# Patient Record
Sex: FEMALE | Race: WHITE | HISPANIC OR LATINO | URBAN - METROPOLITAN AREA
[De-identification: names, ages, dates, MRNs, and addresses within clinical notes are randomized per-mention and may not be internally consistent; named-entity substitution may affect disease eponyms.]

---

## 2023-02-06 ENCOUNTER — HOSPITAL ENCOUNTER (EMERGENCY)
Facility: HOSPITAL | Age: 23
Discharge: HOME | End: 2023-02-06
Attending: EMERGENCY MEDICINE | Admitting: EMERGENCY MEDICINE
Payer: COMMERCIAL

## 2023-02-06 VITALS
HEIGHT: 65 IN | RESPIRATION RATE: 16 BRPM | TEMPERATURE: 98.1 F | DIASTOLIC BLOOD PRESSURE: 98 MMHG | OXYGEN SATURATION: 100 % | HEART RATE: 60 BPM | BODY MASS INDEX: 30.82 KG/M2 | SYSTOLIC BLOOD PRESSURE: 131 MMHG | WEIGHT: 185 LBS

## 2023-02-06 DIAGNOSIS — K05.30 PERICORONITIS: Primary | ICD-10-CM

## 2023-02-06 DIAGNOSIS — K08.89 PAIN, DENTAL: ICD-10-CM

## 2023-02-06 PROCEDURE — 99283 EMERGENCY DEPT VISIT LOW MDM: CPT

## 2023-02-06 RX ORDER — IBUPROFEN 800 MG/1
800 TABLET ORAL ONCE
Status: COMPLETED | OUTPATIENT
Start: 2023-02-06 | End: 2023-02-06

## 2023-02-06 RX ORDER — AMOXICILLIN 875 MG/1
875 TABLET, COATED ORAL 2 TIMES DAILY
Qty: 14 TABLET | Refills: 0 | Status: SHIPPED | OUTPATIENT
Start: 2023-02-06 | End: 2023-02-13

## 2023-02-06 RX ORDER — HYDROCODONE BITARTRATE AND ACETAMINOPHEN 5; 325 MG/1; MG/1
1-2 TABLET ORAL EVERY 8 HOURS PRN
Qty: 5 TABLET | Refills: 0 | Status: SHIPPED | OUTPATIENT
Start: 2023-02-06 | End: 2023-02-08

## 2023-02-06 RX ORDER — IBUPROFEN 600 MG/1
600 TABLET ORAL EVERY 8 HOURS PRN
Qty: 21 TABLET | Refills: 0 | Status: SHIPPED | OUTPATIENT
Start: 2023-02-06

## 2023-02-06 RX ORDER — AMOXICILLIN 875 MG/1
875 TABLET, COATED ORAL EVERY 12 HOURS SCHEDULED
Status: COMPLETED | OUTPATIENT
Start: 2023-02-06 | End: 2023-02-06

## 2023-02-06 RX ADMIN — AMOXICILLIN 875 MG: 875 TABLET, FILM COATED ORAL at 06:45

## 2023-02-06 RX ADMIN — IBUPROFEN 800 MG: 800 TABLET ORAL at 06:45

## 2023-02-06 NOTE — ED PROVIDER NOTES
EMERGENCY DEPARTMENT ENCOUNTER    Pt Name: Catalina Antunez  MRN: [id number]  Pt :   2000  Room Number:    Date of encounter:  2023  PCP: Sissy Patel APRN  ED Provider: Gene Moe MD    Dental pain, 1 to 2 days, severe in nature and keeping her from sleeping, no known injury, fevers chills no nausea or vomiting.        PAST MEDICAL HISTORY  No past medical history on file.      PAST SURGICAL HISTORY  No past surgical history on file.      FAMILY HISTORY  No family history on file.      SOCIAL HISTORY  Social History     Socioeconomic History   • Marital status:          ALLERGIES  Patient has no allergy information on record.        REVIEW OF SYSTEMS  Review of Systems       All systems reviewed and negative except for those discussed in HPI.       PHYSICAL EXAM    I have reviewed the triage vital signs and nursing notes.    ED Triage Vitals [23 0559]   Temp Heart Rate Resp BP SpO2   98.1 °F (36.7 °C) 60 16 131/98 100 %      Temp src Heart Rate Source Patient Position BP Location FiO2 (%)   Oral Monitor Sitting Left arm --       Physical Exam  GENERAL:   Appears in no acute distress.   HENT: Nares patent.  Tenderness over the front incisor, #9.  No gingivitis or abscess on examination.  RESPIRATORY: Normal effort.  No audible wheezes, rales   MUSCULOSKELETAL: No deformities.   NEURO: Alert, moves all extremities,   SKIN: Warm, dry, no rash visualized.    MEDICATIONS GIVEN IN ER    Medications   ibuprofen (ADVIL,MOTRIN) tablet 800 mg (has no administration in time range)   amoxicillin (AMOXIL) tablet 875 mg (has no administration in time range)         MEDICAL DECISION MAKING, PROGRESS, and CONSULTS    All labs have been independently reviewed by me.  All radiology studies have been reviewed by me and the radiologist dictating the report.  All EKG's have been independently viewed and interpreted by me.      Discussion below represents my analysis of pertinent findings  related to patient's condition, differential diagnosis, treatment plan and final disposition.      Differential diagnosis:    Dental pain, abscess, sinusitis          Orders placed during this visit:  No orders of the defined types were placed in this encounter.        Additional orders considered but not ordered:  Indication for laboratory studies, or facial imaging as there is no exam of abscess, or clinical findings of sepsis after evaluation.    ED Course:                      AS OF 06:39 EST VITALS:    BP - 131/98  HR - 60  TEMP - 98.1 °F (36.7 °C) (Oral)  O2 SATS - 100%                  DIAGNOSIS  Final diagnoses:   Pericoronitis   Pain, dental         DISPOSITION  DISCHARGE    Patient discharged in stable condition.    Reviewed implications of results, diagnosis, meds, responsibility to follow up, warning signs and symptoms of possible worsening, potential complications and reasons to return to ER.    Patient/Family voiced understanding of above instructions.    Discussed plan for discharge, as there is no emergent indication for admission.  Pt/family is agreeable and understands need for follow up and possible repeat testing.  Pt/family is aware that discharge does not mean that nothing is wrong but that it indicates no emergency is currently present that requires admission and they must continue care with follow-up as given below or with a physician of their choice.     FOLLOW-UP  PATIENT CONNECTION - [facility] [address]  [phone]        Sissy Patel, APRN  [address] [facility]  [address]  [facility] [address]  [phone]               Medication List      New Prescriptions    amoxicillin 875 MG tablet  Commonly known as: AMOXIL  Take 1 tablet by mouth 2 (Two) Times a Day for 7 days.     HYDROcodone-acetaminophen 5-325 MG per tablet  Commonly known as: NORCO  Take 1-2 tablets by mouth Every 8 (Eight) Hours As Needed for Severe Pain for up to 2 days.     ibuprofen 600 MG  tablet  Commonly known as: ADVIL,MOTRIN  Take 1 tablet by mouth Every 8 (Eight) Hours As Needed for Mild Pain.           Where to Get Your Medications      These medications were sent to [facility] [address] [place] [address] [address] [place] [address] - [phone]  - [phone] [place] [address]    Phone: [phone]   · amoxicillin 875 MG tablet  · HYDROcodone-acetaminophen 5-325 MG per tablet  · ibuprofen 600 MG tablet           Please note that portions of this document were completed with voice recognition software.      Gene Moe MD  02/06/23 [id number]

## 2023-04-19 ENCOUNTER — TELEPHONE (OUTPATIENT)
Dept: FAMILY MEDICINE CLINIC | Facility: CLINIC | Age: 23
End: 2023-04-19

## 2023-04-19 ENCOUNTER — OFFICE VISIT (OUTPATIENT)
Dept: FAMILY MEDICINE CLINIC | Facility: CLINIC | Age: 23
End: 2023-04-19
Payer: COMMERCIAL

## 2023-04-19 ENCOUNTER — LAB (OUTPATIENT)
Dept: LAB | Facility: HOSPITAL | Age: 23
End: 2023-04-19
Payer: COMMERCIAL

## 2023-04-19 VITALS
WEIGHT: 184.4 LBS | HEIGHT: 65 IN | DIASTOLIC BLOOD PRESSURE: 80 MMHG | HEART RATE: 73 BPM | OXYGEN SATURATION: 98 % | SYSTOLIC BLOOD PRESSURE: 118 MMHG | BODY MASS INDEX: 30.72 KG/M2

## 2023-04-19 DIAGNOSIS — L65.9 HAIR LOSS: ICD-10-CM

## 2023-04-19 DIAGNOSIS — N89.8 VAGINAL DISCHARGE: Primary | ICD-10-CM

## 2023-04-19 DIAGNOSIS — E11.65 TYPE 2 DIABETES MELLITUS WITH HYPERGLYCEMIA, WITHOUT LONG-TERM CURRENT USE OF INSULIN: Primary | ICD-10-CM

## 2023-04-19 DIAGNOSIS — R53.83 FATIGUE, UNSPECIFIED TYPE: ICD-10-CM

## 2023-04-19 DIAGNOSIS — E11.65 TYPE 2 DIABETES MELLITUS WITH HYPERGLYCEMIA, WITHOUT LONG-TERM CURRENT USE OF INSULIN: ICD-10-CM

## 2023-04-19 LAB
25(OH)D3 SERPL-MCNC: 19.7 NG/ML (ref 30–100)
ALBUMIN SERPL-MCNC: 4.4 G/DL (ref 3.5–5.2)
ALBUMIN/GLOB SERPL: 1.1 G/DL
ALP SERPL-CCNC: 141 U/L (ref 39–117)
ALT SERPL W P-5'-P-CCNC: 27 U/L (ref 1–33)
ANION GAP SERPL CALCULATED.3IONS-SCNC: 12 MMOL/L (ref 5–15)
AST SERPL-CCNC: 20 U/L (ref 1–32)
BILIRUB SERPL-MCNC: 0.3 MG/DL (ref 0–1.2)
BUN SERPL-MCNC: 10 MG/DL (ref 6–20)
BUN/CREAT SERPL: 13.9 (ref 7–25)
CALCIUM SPEC-SCNC: 9.2 MG/DL (ref 8.6–10.5)
CHLORIDE SERPL-SCNC: 99 MMOL/L (ref 98–107)
CHOLEST SERPL-MCNC: 204 MG/DL (ref 0–200)
CO2 SERPL-SCNC: 23 MMOL/L (ref 22–29)
CREAT SERPL-MCNC: 0.72 MG/DL (ref 0.57–1)
DEPRECATED RDW RBC AUTO: 37.7 FL (ref 37–54)
EGFRCR SERPLBLD CKD-EPI 2021: 120.7 ML/MIN/1.73
ERYTHROCYTE [DISTWIDTH] IN BLOOD BY AUTOMATED COUNT: 11.8 % (ref 12.3–15.4)
EXPIRATION DATE: NORMAL
GLOBULIN UR ELPH-MCNC: 4 GM/DL
GLUCOSE SERPL-MCNC: 348 MG/DL (ref 65–99)
HBA1C MFR BLD: 12.7 %
HCT VFR BLD AUTO: 45.7 % (ref 34–46.6)
HDLC SERPL-MCNC: 47 MG/DL (ref 40–60)
HGB BLD-MCNC: 15 G/DL (ref 12–15.9)
LDLC SERPL CALC-MCNC: 133 MG/DL (ref 0–100)
LDLC/HDLC SERPL: 2.78 {RATIO}
Lab: NORMAL
MCH RBC QN AUTO: 29.2 PG (ref 26.6–33)
MCHC RBC AUTO-ENTMCNC: 32.8 G/DL (ref 31.5–35.7)
MCV RBC AUTO: 88.9 FL (ref 79–97)
PLATELET # BLD AUTO: 278 10*3/MM3 (ref 140–450)
PMV BLD AUTO: 10.2 FL (ref 6–12)
POTASSIUM SERPL-SCNC: 4.6 MMOL/L (ref 3.5–5.2)
PROT SERPL-MCNC: 8.4 G/DL (ref 6–8.5)
RBC # BLD AUTO: 5.14 10*6/MM3 (ref 3.77–5.28)
SODIUM SERPL-SCNC: 134 MMOL/L (ref 136–145)
TRIGL SERPL-MCNC: 132 MG/DL (ref 0–150)
TSH SERPL DL<=0.05 MIU/L-ACNC: 2.04 UIU/ML (ref 0.27–4.2)
VLDLC SERPL-MCNC: 24 MG/DL (ref 5–40)
WBC NRBC COR # BLD: 5.57 10*3/MM3 (ref 3.4–10.8)

## 2023-04-19 PROCEDURE — 87798 DETECT AGENT NOS DNA AMP: CPT | Performed by: NURSE PRACTITIONER

## 2023-04-19 PROCEDURE — 87661 TRICHOMONAS VAGINALIS AMPLIF: CPT | Performed by: NURSE PRACTITIONER

## 2023-04-19 PROCEDURE — 87491 CHLMYD TRACH DNA AMP PROBE: CPT | Performed by: NURSE PRACTITIONER

## 2023-04-19 PROCEDURE — 87801 DETECT AGNT MULT DNA AMPLI: CPT | Performed by: NURSE PRACTITIONER

## 2023-04-19 PROCEDURE — 87591 N.GONORRHOEAE DNA AMP PROB: CPT | Performed by: NURSE PRACTITIONER

## 2023-04-19 PROCEDURE — 80061 LIPID PANEL: CPT

## 2023-04-19 PROCEDURE — 82306 VITAMIN D 25 HYDROXY: CPT

## 2023-04-19 PROCEDURE — 80050 GENERAL HEALTH PANEL: CPT

## 2023-04-19 RX ORDER — GLUCOSAMINE HCL/CHONDROITIN SU 500-400 MG
1 CAPSULE ORAL 4 TIMES DAILY
Qty: 150 EACH | Refills: 11 | Status: SHIPPED | OUTPATIENT
Start: 2023-04-19

## 2023-04-19 RX ORDER — BLOOD-GLUCOSE METER
1 KIT MISCELLANEOUS ONCE
Qty: 1 EACH | Refills: 0 | Status: SHIPPED | OUTPATIENT
Start: 2023-04-19 | End: 2023-04-19

## 2023-04-19 RX ORDER — LANCETS
EACH MISCELLANEOUS
Qty: 120 EACH | Refills: 11 | Status: SHIPPED | OUTPATIENT
Start: 2023-04-19

## 2023-04-19 NOTE — PROGRESS NOTES
"Chief Complaint  Diabetes (Pt states she would like to get checked.)    Subjective        Catalina Antunez presents to [facility]  History of Present Illness  Pt is here today for Diabetes follow up. She takes Jardiance for management. She was diagnosed last year. A1C today is 12.7%. She took Metformin in the past, but was not able to tolerate due to GI effects. She was started on Jardiance after that. She has been taking her Jardiance about twice a week. She saw a Diabetes Educator in the past. She reports that she craves sweets and carbs around her menstrual cycle. Otherwise, she tries to portion her foods appropriately for a pt with Diabetes. She has been eating more vegetables recently. Will follow up in 3 months. Pt agrees to take medication daily. She will also check blood glucose levels frequently at home. She does not have a monitor; will send prescription to pharmacy. Labs will be drawn today and once reviewed, will decide if medication changes need to be made. She has a week supply of Jardiance at home.     She is also concerned about yeast infection. She has vaginal itching and has yellow mucous like discharge. Last sexual activity was approximately one month ago.     Objective   Vital Signs:  /80   Pulse 73   Ht 165.1 cm (65\")   Wt 83.6 kg (184 lb 6.4 oz)   SpO2 98%   BMI 30.69 kg/m²   Estimated body mass index is 30.69 kg/m² as calculated from the following:    Height as of this encounter: 165.1 cm (65\").    Weight as of this encounter: 83.6 kg (184 lb 6.4 oz).           Physical Exam  Vitals reviewed.   Constitutional:       Appearance: Normal appearance.   HENT:      Nose: Nose normal.      Mouth/Throat:      Pharynx: Oropharynx is clear.   Eyes:      Conjunctiva/sclera: Conjunctivae normal.   Cardiovascular:      Rate and Rhythm: Normal rate and regular rhythm.      Heart sounds: Normal heart sounds.   Pulmonary:      Effort: Pulmonary effort is normal.      " Breath sounds: Normal breath sounds.   Musculoskeletal:         General: Normal range of motion.      Cervical back: Normal range of motion.   Skin:     General: Skin is warm.   Neurological:      Mental Status: She is alert and oriented to person, place, and time.   Psychiatric:         Mood and Affect: Mood normal.         Behavior: Behavior normal.         Thought Content: Thought content normal.        Result Review :                 Assessment and Plan   Diagnoses and all orders for this visit:    1. Vaginal discharge (Primary)  -     Chlamydia trachomatis, Neisseria gonorrhoeae, Trichomonas vaginalis, PCR - Urine, Urine, Clean Catch; Future  -     NuSwab VG+ - Swab, Vagina; Future  -     Chlamydia trachomatis, Neisseria gonorrhoeae, Trichomonas vaginalis, PCR - Urine, Urine, Clean Catch  -     NuSwab VG+ - Swab, Vagina    2. Type 2 diabetes mellitus with hyperglycemia, without long-term current use of insulin  -     POC Glycosylated Hemoglobin (Hb A1C)  -     glucose monitor monitoring kit; 1 each 1 (One) Time for 1 dose.  Dispense: 1 each; Refill: 0  -     Comprehensive Metabolic Panel; Future  -     Lipid Panel; Future    3. Fatigue, unspecified type  -     CBC (No Diff); Future  -     Vitamin D,25-Hydroxy; Future  -     TSH Rfx On Abnormal To Free T4; Future    4. Hair loss  -     TSH Rfx On Abnormal To Free T4; Future             Follow Up   Return in about 3 months (around 7/19/2023) for Annual.  Patient was given instructions and counseling regarding her condition or for health maintenance advice. Please see specific information pulled into the AVS if appropriate.

## 2023-04-19 NOTE — TELEPHONE ENCOUNTER
Pharmacy Name: [facility] [id number] [place] [id number] AT [facility] - [phone] [facility] [phone]      Pharmacy representative name: ADRIAN    Pharmacy representative phone number: [phone]    What question does the pharmacy have: PHARMACY STATES THAT THEY RECEIVED THE PRESCRIPTION FOR THE GLUCOSE MONITOR, BUT THIS DID NOT INCLUDE THE TEST STRIPS OR LANCETS FOR THE DEVICE.    Who is the provider that prescribed the medication: IRENA MACHUCA    Additional notes: PHARMACY ALSO STATES THAT THEY DO NOT HAVE INSURANCE ON FILE FOR THE PATIENT AND WANTED TO ASK ABOUT OBTAINING THAT INFORMATION, IF APPLICABLE, BECAUSE SELF PAY COSTS FOR THIS DEVICE ARE TYPICALLY VERY EXPENSIVE.

## 2023-04-21 ENCOUNTER — TELEPHONE (OUTPATIENT)
Dept: FAMILY MEDICINE CLINIC | Facility: CLINIC | Age: 23
End: 2023-04-21
Payer: COMMERCIAL

## 2023-04-21 DIAGNOSIS — E11.65 TYPE 2 DIABETES MELLITUS WITH HYPERGLYCEMIA, WITHOUT LONG-TERM CURRENT USE OF INSULIN: Primary | ICD-10-CM

## 2023-04-21 LAB
C TRACH RRNA SPEC QL NAA+PROBE: NEGATIVE
N GONORRHOEA RRNA SPEC QL NAA+PROBE: NEGATIVE
T VAGINALIS RRNA SPEC QL NAA+PROBE: NEGATIVE

## 2023-04-21 RX ORDER — BLOOD-GLUCOSE METER
1 KIT MISCELLANEOUS DAILY
Qty: 1 EACH | Refills: 0 | Status: SHIPPED | OUTPATIENT
Start: 2023-04-21

## 2023-04-21 NOTE — TELEPHONE ENCOUNTER
Rx Refill Note  Requested Prescriptions     Signed Prescriptions Disp Refills   • glucose monitor monitoring kit 1 each 0     Si each Daily. Use daily as directed.     Authorizing Provider: IRENA MACHUCA     Ordering User: NELL DAMON      Last office visit with prescribing clinician: 2023   Last telemedicine visit with prescribing clinician: 2023   Next office visit with prescribing clinician: 2023                         Would you like a call back once the refill request has been completed: [] Yes [] No    If the office needs to give you a call back, can they leave a voicemail: [] Yes [] No    Nell Damon MA  23, 15:47 EDT

## 2023-04-21 NOTE — TELEPHONE ENCOUNTER
Caller: [facility] [address] [place] [address] - [phone] [facility] [phone]     Relationship: Pharmacy    Best call back number:[phone]    What medications are you currently taking:   Current Outpatient Medications on File Prior to Visit   Medication Sig Dispense Refill   • empagliflozin (JARDIANCE) 25 MG tablet tablet Take 1 tablet by mouth Daily.     • Glucose Blood (Blood Glucose Test) strip 1 each by In Vitro route 4 (Four) Times a Day. 150 each 11   • Lancets (onetouch ultrasoft) lancets Check blood glucose 30 minutes before each meal and before bed. 120 each 11   • norgestrel-ethinyl estradiol (LOW-OGESTREL,CRYSELLE) 0.3-30 MG-MCG per tablet Take 1 tablet by mouth Daily. 28 tablet 12     No current facility-administered medications on file prior to visit.          Which medication are you concerned about: TEST STRIPS AND LANCETS        What are your concerns: PHARMACY STATED THE LANCETS SAID ONE TOUCH AND THE TEST STRIPS DIDN'T SPECIFY SO THEY CONTACTED THE PATIENT TO CONFIRM WHAT HER METER WAS AND SHE LOST HER METER IN A MOVE. PHARMACY REQUESTING TO SEND NEW PRESCRIPTION FOR THE ONE TOUCH METER AS WELL AS THEY HAVE THE ONE TOUCH LANCETS AND TEST STRIPS AND HER INSURANCE PAYS FOR THE ONE TOUCH

## 2023-04-23 LAB
A VAGINAE DNA VAG QL NAA+PROBE: ABNORMAL SCORE
BVAB2 DNA VAG QL NAA+PROBE: ABNORMAL SCORE
C ALBICANS DNA VAG QL NAA+PROBE: POSITIVE
C GLABRATA DNA VAG QL NAA+PROBE: POSITIVE
C TRACH DNA VAG QL NAA+PROBE: NEGATIVE
MEGA1 DNA VAG QL NAA+PROBE: ABNORMAL SCORE
N GONORRHOEA DNA VAG QL NAA+PROBE: NEGATIVE
T VAGINALIS DNA VAG QL NAA+PROBE: NEGATIVE

## 2023-04-24 RX ORDER — FLUCONAZOLE 150 MG/1
150 TABLET ORAL ONCE
Qty: 1 TABLET | Refills: 0 | Status: SHIPPED | OUTPATIENT
Start: 2023-04-24 | End: 2023-04-24

## 2023-04-24 RX ORDER — METRONIDAZOLE 500 MG/1
500 TABLET ORAL 2 TIMES DAILY
Qty: 14 TABLET | Refills: 0 | Status: SHIPPED | OUTPATIENT
Start: 2023-04-24 | End: 2023-05-01

## 2023-04-26 NOTE — TELEPHONE ENCOUNTER
Rx Refill Note  Requested Prescriptions     Pending Prescriptions Disp Refills   • norgestrel-ethinyl estradiol (LOW-OGESTREL,CRYSELLE) 0.3-30 MG-MCG per tablet 28 tablet 12     Sig: Take 1 tablet by mouth Daily.   • empagliflozin (JARDIANCE) 25 MG tablet tablet 30 tablet      Sig: Take 1 tablet by mouth Daily.      Last office visit with prescribing clinician: 4/19/2023   Last telemedicine visit with prescribing clinician: 7/20/2023   Next office visit with prescribing clinician: 7/20/2023                         Would you like a call back once the refill request has been completed: [] Yes [] No    If the office needs to give you a call back, can they leave a voicemail: [] Yes [] No    Jyothi Blair MA  04/26/23, 09:33 EDT

## 2023-05-03 ENCOUNTER — PATIENT MESSAGE (OUTPATIENT)
Dept: FAMILY MEDICINE CLINIC | Facility: CLINIC | Age: 23
End: 2023-05-03
Payer: COMMERCIAL

## 2023-06-13 ENCOUNTER — PRIOR AUTHORIZATION (OUTPATIENT)
Dept: FAMILY MEDICINE CLINIC | Facility: CLINIC | Age: 23
End: 2023-06-13
Payer: COMMERCIAL

## 2023-06-13 NOTE — TELEPHONE ENCOUNTER
(Key: [id number])  Med:Jardiance 25MG tablets  Status:sent to plan, awaiting determination   Created:06/13/2023

## 2023-09-18 ENCOUNTER — OFFICE VISIT (OUTPATIENT)
Dept: INTERNAL MEDICINE | Facility: CLINIC | Age: 23
End: 2023-09-18
Payer: COMMERCIAL

## 2023-09-18 VITALS
HEART RATE: 86 BPM | DIASTOLIC BLOOD PRESSURE: 64 MMHG | HEIGHT: 66 IN | TEMPERATURE: 98.2 F | OXYGEN SATURATION: 97 % | WEIGHT: 196.6 LBS | SYSTOLIC BLOOD PRESSURE: 114 MMHG | BODY MASS INDEX: 31.6 KG/M2

## 2023-09-18 DIAGNOSIS — F41.9 ANXIETY AND DEPRESSION: ICD-10-CM

## 2023-09-18 DIAGNOSIS — F32.A ANXIETY AND DEPRESSION: ICD-10-CM

## 2023-09-18 DIAGNOSIS — E11.65 TYPE 2 DIABETES MELLITUS WITH HYPERGLYCEMIA, UNSPECIFIED WHETHER LONG TERM INSULIN USE: ICD-10-CM

## 2023-09-18 DIAGNOSIS — Z34.90 PREGNANCY, UNSPECIFIED GESTATIONAL AGE: Primary | ICD-10-CM

## 2023-09-18 DIAGNOSIS — N92.6 MISSED PERIOD: ICD-10-CM

## 2023-09-18 LAB
EXPIRATION DATE: NORMAL
HBA1C MFR BLD: 10.1 %
Lab: NORMAL

## 2023-09-18 NOTE — PROGRESS NOTES
Office Note     Name: Catalina Antunez    : 2000     MRN: [id number]     Chief Complaint  Diabetes, Cyst (On vaginal area and has pus ), Depression (Phq 17 ), and Anxiety (Phq 17)    Subjective     History of Present Illness:  Catalina Antunez is a 23 y.o. female who presents today for         Review of Systems:   Review of Systems    Past Medical History:   Past Medical History:   Diagnosis Date   • Diabetes mellitus 2023       Past Surgical History:   Past Surgical History:   Procedure Laterality Date   • CHOLECYSTECTOMY  2019       Family History:   Family History   Problem Relation Age of Onset   • Diabetes Mother        Social History:   Social History     Socioeconomic History   • Marital status:    Tobacco Use   • Smoking status: Never   • Smokeless tobacco: Never   Vaping Use   • Vaping Use: Never used   Substance and Sexual Activity   • Alcohol use: Yes     Alcohol/week: 2.0 standard drinks     Types: 2 Cans of beer per week   • Drug use: Never   • Sexual activity: Yes     Partners: Male     Birth control/protection: Pill       Immunizations:   Immunization History   Administered Date(s) Administered   • DTaP 1999, 2000, 2001, 2003   • DTaP, Unspecified 2010   • FluMist 2-49yrs 2015   • Fluzone (or Fluarix & Flulaval for VFC) >6mos 10/15/2018   • HPV Quadrivalent 2010, 2011, 06/15/2016, 2017   • Hep A, 2 Dose 2007, 2008   • Hep A, Unspecified 2007, 2008   • Hepatitis B Adult/Adolescent IM 1999, 2000, 2001   • Hib (PRP-OMP) 1999, 2000   • Hpv9 06/15/2016, 2016, 2017   • IPV 1999, 2000, 2001, 2003   • Influenza LAIV (Nasal) 2011, 2012   • Influenza, Unspecified 10/15/2018, 2019   • MMR 2000, 2001   • Meningococcal MCV4P (Menactra) 2010, 06/15/2016   • PEDS-Pneumococcal Conjugate (PCV7) 2010  "  • PPD Test 09/15/2016   • Tdap 08/04/2010, 01/02/2020   • Varicella 07/10/2002, 08/29/2011        Medications:     Current Outpatient Medications:   •  Glucose Blood (Blood Glucose Test) strip, 1 each by In Vitro route 4 (Four) Times a Day., Disp: 150 each, Rfl: 11  •  Lancets (onetouch ultrasoft) lancets, Check blood glucose 30 minutes before each meal and before bed., Disp: 120 each, Rfl: 11  •  empagliflozin (JARDIANCE) 25 MG tablet tablet, Take 1 tablet by mouth Daily., Disp: 30 tablet, Rfl: 2  •  glucose monitor monitoring kit, 1 each Daily. Use daily as directed. (Patient not taking: Reported on 9/18/2023), Disp: 1 each, Rfl: 0  •  norgestrel-ethinyl estradiol (LOW-OGESTREL,CRYSELLE) 0.3-30 MG-MCG per tablet, Take 1 tablet by mouth Daily for 168 days., Disp: 28 tablet, Rfl: 5    Allergies:   Allergies   Allergen Reactions   • Bee Venom Swelling   • Mushroom Swelling       Objective     Vital Signs  /64   Pulse 86   Temp 98.2 °F (36.8 °C) (Temporal)   Ht 167 cm (65.75\")   Wt 89.2 kg (196 lb 9.6 oz)   SpO2 97%   BMI 31.97 kg/m²   Estimated body mass index is 31.97 kg/m² as calculated from the following:    Height as of this encounter: 167 cm (65.75\").    Weight as of this encounter: 89.2 kg (196 lb 9.6 oz).          Physical Exam     Result Review :                  Assessment and Plan     There are no diagnoses linked to this encounter.     Follow Up  No follow-ups on file.    MD TOM HurleyE PC [place]  [facility]  [address] [facility]  [address] [address] [phone]    "

## 2023-09-18 NOTE — PROGRESS NOTES
Office Note     Name: Catalina Antunez    : 2000     MRN: [id number]     Chief Complaint  Diabetes, Cyst (On vaginal area and has pus ), Depression (Phq 17 ), and Anxiety (Phq 17)    Subjective     History of Present Illness:  Catalina Antunez is a 23 y.o. female who presents today for       She is .    Found out she was pregnant 2 weeks ago, she has an appointment with ob/gyn tomorrow.  This will be her   Cysts on the vaginal wall cyst    PHQ-9 Depression Screening  Little interest or pleasure in doing things? 2-->more than half the days   Feeling down, depressed, or hopeless? 0-->not at all   Trouble falling or staying asleep, or sleeping too much? 3-->nearly every day   Feeling tired or having little energy? 3-->nearly every day   Poor appetite or overeating? 3-->nearly every day   Feeling bad about yourself - or that you are a failure or have let yourself or your family down? 0-->not at all   Trouble concentrating on things, such as reading the newspaper or watching television? 3-->nearly every day   Moving or speaking so slowly that other people could have noticed? Or the opposite - being so fidgety or restless that you have been moving around a lot more than usual? 3-->nearly every day   Thoughts that you would be better off dead, or of hurting yourself in some way? 0-->not at all   PHQ-9 Total Score 17   If you checked off any problems, how difficult have these problems made it for you to do your work, take care of things at home, or get along with other people? somewhat difficult       Anxiety JENNIFER-7    Feeling nervous, anxious or on edge: Nearly every day  Not being able to stop or control worrying: Nearly every day  Worrying too much about different things: Nearly every day  Trouble Relaxing: Nearly every day  Being so restless that it is hard to sit still: Nearly every day  Becoming easily annoyed or irritable: Several days  Feeling afraid as if something awful might happen: Several  days  JENNIFER 7 Total Score: 17  If you checked any problems, how difficult have these problems made it for you to do your work, take care of things at home, or get along with other people: Somewhat difficult       Type II DM  She was on jardiance, but has been non-compliant.  he took Metformin in the past, but was not able to tolerate due to GI effects.     Review of Systems:   Review of Systems   All other systems reviewed and are negative.    Past Medical History:   Past Medical History:   Diagnosis Date    Diabetes mellitus October 2023       Past Surgical History:   Past Surgical History:   Procedure Laterality Date    CHOLECYSTECTOMY  July 2019       Family History:   Family History   Problem Relation Age of Onset    Diabetes Mother        Social History:   Social History     Socioeconomic History    Marital status:    Tobacco Use    Smoking status: Never    Smokeless tobacco: Never   Vaping Use    Vaping Use: Never used   Substance and Sexual Activity    Alcohol use: Yes     Alcohol/week: 2.0 standard drinks     Types: 2 Cans of beer per week    Drug use: Never    Sexual activity: Yes     Partners: Male     Birth control/protection: Pill       Immunizations:   Immunization History   Administered Date(s) Administered    DTaP 1999, 11/16/2000, 01/18/2001, 07/17/2003    DTaP, Unspecified 08/04/2010    FluMist 2-49yrs 11/16/2015    Fluzone (or Fluarix & Flulaval for VFC) >6mos 10/15/2018    HPV Quadrivalent 08/04/2010, 07/19/2011, 06/15/2016, 01/05/2017    Hep A, 2 Dose 06/18/2007, 01/16/2008    Hep A, Unspecified 04/18/2007, 01/16/2008    Hepatitis B Adult/Adolescent IM 1999, 11/16/2000, 01/18/2001    Hib (PRP-OMP) 1999, 11/16/2000    Hpv9 06/15/2016, 08/08/2016, 01/05/2017    IPV 1999, 11/16/2000, 01/18/2001, 07/17/2003    Influenza LAIV (Nasal) 09/29/2011, 11/06/2012    Influenza, Unspecified 10/15/2018, 09/11/2019    MMR 11/16/2000, 01/18/2001    Meningococcal MCV4P (Menactra)  "08/04/2010, 06/15/2016    PEDS-Pneumococcal Conjugate (PCV7) 08/04/2010    PPD Test 09/15/2016    Tdap 08/04/2010, 01/02/2020    Varicella 07/10/2002, 08/29/2011        Medications:     Current Outpatient Medications:     Glucose Blood (Blood Glucose Test) strip, 1 each by In Vitro route 4 (Four) Times a Day., Disp: 150 each, Rfl: 11    Lancets (onetouch ultrasoft) lancets, Check blood glucose 30 minutes before each meal and before bed., Disp: 120 each, Rfl: 11    Allergies:   Allergies   Allergen Reactions    Bee Venom Swelling    Mushroom Swelling       Objective     Vital Signs  /64   Pulse 86   Temp 98.2 °F (36.8 °C) (Temporal)   Ht 167 cm (65.75\")   Wt 89.2 kg (196 lb 9.6 oz)   SpO2 97%   BMI 31.97 kg/m²   Estimated body mass index is 31.97 kg/m² as calculated from the following:    Height as of this encounter: 167 cm (65.75\").    Weight as of this encounter: 89.2 kg (196 lb 9.6 oz).          Physical Exam  Constitutional:       Appearance: Normal appearance.   Cardiovascular:      Rate and Rhythm: Normal rate and regular rhythm.      Pulses: Normal pulses.      Heart sounds: Normal heart sounds.   Pulmonary:      Effort: Pulmonary effort is normal.      Breath sounds: Normal breath sounds.   Neurological:      Mental Status: She is alert.        Result Review :                  Assessment and Plan     1. Pregnancy, unspecified gestational age  2. Missed period  Patient with home pregnancy test that is positive. She has an appointment with her ob/Gyn this week.      3. Type 2 diabetes mellitus with hyperglycemia, unspecified whether long term insulin use  A1c 10.0,  Was on jardiance but not currently taking it.  Due to patient recently beign aware she is pregnant, she has follow up with ob/gyn.  Will defer management to gyn   - POC Glycosylated Hemoglobin (Hb A1C)    4. Anxiety and depression  PHQ9,: 19, Carlos :17  Will defer medication management at this time, as patient doesn't want to take med " while pregnant.         Follow Up  No follow-ups on file.    MD TOM HurleyE PC POLINA LIRIANO  [facility]  [address]  [address] [address] [phone]

## 2024-06-07 ENCOUNTER — TELEPHONE (OUTPATIENT)
Dept: INTERNAL MEDICINE | Facility: CLINIC | Age: 24
End: 2024-06-07

## 2024-06-07 NOTE — TELEPHONE ENCOUNTER
Caller: Catalina Antunez    Relationship: Self    Best call back number: [phone]     Who is your current provider: Sung Dejesus MD     Is your current provider offboarding? NO    Who would you like your new provider to be: A FEMALE PROVIDER     What are your reasons for transferring care: MORE COMFORTABLE WITH A FEMALE PROVIDER    Additional notes:  PATIENT NEEDS TO BE SCHEDULED FOR A PHYSICAL AND DISCUSS WEIGHT LOSS. PLEASE CONTACT FOR SCHEDULING !

## 2024-06-14 ENCOUNTER — LAB (OUTPATIENT)
Dept: INTERNAL MEDICINE | Facility: CLINIC | Age: 24
End: 2024-06-14
Payer: COMMERCIAL

## 2024-06-14 ENCOUNTER — OFFICE VISIT (OUTPATIENT)
Dept: INTERNAL MEDICINE | Facility: CLINIC | Age: 24
End: 2024-06-14
Payer: COMMERCIAL

## 2024-06-14 VITALS
SYSTOLIC BLOOD PRESSURE: 100 MMHG | OXYGEN SATURATION: 96 % | TEMPERATURE: 96.8 F | HEART RATE: 86 BPM | WEIGHT: 212.2 LBS | DIASTOLIC BLOOD PRESSURE: 58 MMHG | HEIGHT: 66 IN | BODY MASS INDEX: 34.1 KG/M2

## 2024-06-14 DIAGNOSIS — F41.9 ANXIETY: ICD-10-CM

## 2024-06-14 DIAGNOSIS — R53.83 FATIGUE, UNSPECIFIED TYPE: ICD-10-CM

## 2024-06-14 DIAGNOSIS — E11.65 TYPE 2 DIABETES MELLITUS WITH HYPERGLYCEMIA, UNSPECIFIED WHETHER LONG TERM INSULIN USE: Primary | ICD-10-CM

## 2024-06-14 DIAGNOSIS — E66.09 CLASS 1 OBESITY DUE TO EXCESS CALORIES WITHOUT SERIOUS COMORBIDITY WITH BODY MASS INDEX (BMI) OF 34.0 TO 34.9 IN ADULT: ICD-10-CM

## 2024-06-14 LAB
25(OH)D3 SERPL-MCNC: 26.5 NG/ML (ref 30–100)
BASOPHILS # BLD AUTO: 0.03 10*3/MM3 (ref 0–0.2)
BASOPHILS NFR BLD AUTO: 0.5 % (ref 0–1.5)
DEPRECATED RDW RBC AUTO: 44.4 FL (ref 37–54)
EOSINOPHIL # BLD AUTO: 0.26 10*3/MM3 (ref 0–0.4)
EOSINOPHIL NFR BLD AUTO: 4.5 % (ref 0.3–6.2)
ERYTHROCYTE [DISTWIDTH] IN BLOOD BY AUTOMATED COUNT: 13.8 % (ref 12.3–15.4)
HCT VFR BLD AUTO: 36.9 % (ref 34–46.6)
HGB BLD-MCNC: 11.5 G/DL (ref 12–15.9)
IMM GRANULOCYTES # BLD AUTO: 0.01 10*3/MM3 (ref 0–0.05)
IMM GRANULOCYTES NFR BLD AUTO: 0.2 % (ref 0–0.5)
LYMPHOCYTES # BLD AUTO: 1.56 10*3/MM3 (ref 0.7–3.1)
LYMPHOCYTES NFR BLD AUTO: 27.1 % (ref 19.6–45.3)
MCH RBC QN AUTO: 27.4 PG (ref 26.6–33)
MCHC RBC AUTO-ENTMCNC: 31.2 G/DL (ref 31.5–35.7)
MCV RBC AUTO: 87.9 FL (ref 79–97)
MONOCYTES # BLD AUTO: 0.37 10*3/MM3 (ref 0.1–0.9)
MONOCYTES NFR BLD AUTO: 6.4 % (ref 5–12)
NEUTROPHILS NFR BLD AUTO: 3.52 10*3/MM3 (ref 1.7–7)
NEUTROPHILS NFR BLD AUTO: 61.3 % (ref 42.7–76)
NRBC BLD AUTO-RTO: 0 /100 WBC (ref 0–0.2)
PLATELET # BLD AUTO: 285 10*3/MM3 (ref 140–450)
PMV BLD AUTO: 10.5 FL (ref 6–12)
RBC # BLD AUTO: 4.2 10*6/MM3 (ref 3.77–5.28)
WBC NRBC COR # BLD AUTO: 5.75 10*3/MM3 (ref 3.4–10.8)

## 2024-06-14 PROCEDURE — 82043 UR ALBUMIN QUANTITATIVE: CPT | Performed by: STUDENT IN AN ORGANIZED HEALTH CARE EDUCATION/TRAINING PROGRAM

## 2024-06-14 PROCEDURE — 1160F RVW MEDS BY RX/DR IN RCRD: CPT | Performed by: STUDENT IN AN ORGANIZED HEALTH CARE EDUCATION/TRAINING PROGRAM

## 2024-06-14 PROCEDURE — 84466 ASSAY OF TRANSFERRIN: CPT | Performed by: STUDENT IN AN ORGANIZED HEALTH CARE EDUCATION/TRAINING PROGRAM

## 2024-06-14 PROCEDURE — 3052F HG A1C>EQUAL 8.0%<EQUAL 9.0%: CPT | Performed by: STUDENT IN AN ORGANIZED HEALTH CARE EDUCATION/TRAINING PROGRAM

## 2024-06-14 PROCEDURE — 36415 COLL VENOUS BLD VENIPUNCTURE: CPT | Performed by: STUDENT IN AN ORGANIZED HEALTH CARE EDUCATION/TRAINING PROGRAM

## 2024-06-14 PROCEDURE — 83540 ASSAY OF IRON: CPT | Performed by: STUDENT IN AN ORGANIZED HEALTH CARE EDUCATION/TRAINING PROGRAM

## 2024-06-14 PROCEDURE — 80050 GENERAL HEALTH PANEL: CPT | Performed by: STUDENT IN AN ORGANIZED HEALTH CARE EDUCATION/TRAINING PROGRAM

## 2024-06-14 PROCEDURE — 83036 HEMOGLOBIN GLYCOSYLATED A1C: CPT | Performed by: STUDENT IN AN ORGANIZED HEALTH CARE EDUCATION/TRAINING PROGRAM

## 2024-06-14 PROCEDURE — 1159F MED LIST DOCD IN RCRD: CPT | Performed by: STUDENT IN AN ORGANIZED HEALTH CARE EDUCATION/TRAINING PROGRAM

## 2024-06-14 PROCEDURE — 99214 OFFICE O/P EST MOD 30 MIN: CPT | Performed by: STUDENT IN AN ORGANIZED HEALTH CARE EDUCATION/TRAINING PROGRAM

## 2024-06-14 PROCEDURE — 80061 LIPID PANEL: CPT | Performed by: STUDENT IN AN ORGANIZED HEALTH CARE EDUCATION/TRAINING PROGRAM

## 2024-06-14 PROCEDURE — 82306 VITAMIN D 25 HYDROXY: CPT | Performed by: STUDENT IN AN ORGANIZED HEALTH CARE EDUCATION/TRAINING PROGRAM

## 2024-06-14 RX ORDER — LEVONORGESTREL 52 MG/1
1 INTRAUTERINE DEVICE INTRAUTERINE ONCE
COMMUNITY
Start: 2024-05-21

## 2024-06-14 RX ORDER — METFORMIN HYDROCHLORIDE 500 MG/1
500 TABLET, EXTENDED RELEASE ORAL
COMMUNITY
Start: 2024-01-22 | End: 2024-06-17

## 2024-06-14 RX ORDER — ONDANSETRON 4 MG/1
4 TABLET, ORALLY DISINTEGRATING ORAL AS NEEDED
COMMUNITY
Start: 2023-12-28

## 2024-06-14 NOTE — PROGRESS NOTES
Office Note     Name: Catalina Antunez    : 2000     MRN: [id number]     Chief Complaint  Diabetes (F/u ), Obesity, Fatigue (Stated she would like blood work ), Anxiety (Phq16 ), and Depression (Phq 16 would like to address )    Subjective     History of Present Illness:  Catalina Antunez is a 23 y.o. female who presents today for     Type II DM  She was taking jardiance but during her pregnancy switch to metformin  Currently on metformin, would like to switch back to jardiance.       Havening anxiety,   Worries a lot/ (little things that come to her head), feeling more fatigue.  Would get anxious, have chest pains, and get short of breath . Been having daily episodes for the past feew weeks, no triggers. Doesn't worsen with anxiety.    Review of Systems:   Review of Systems   All other systems reviewed and are negative.      Past Medical History:   Past Medical History:   Diagnosis Date    Diabetes mellitus 2023       Past Surgical History:   Past Surgical History:   Procedure Laterality Date    CHOLECYSTECTOMY  2019       Family History:   Family History   Problem Relation Age of Onset    Diabetes Mother        Social History:   Social History     Socioeconomic History    Marital status:    Tobacco Use    Smoking status: Never     Passive exposure: Never    Smokeless tobacco: Never   Vaping Use    Vaping status: Never Used   Substance and Sexual Activity    Alcohol use: Yes     Alcohol/week: 2.0 standard drinks of alcohol     Types: 2 Cans of beer per week    Drug use: Never    Sexual activity: Yes     Partners: Male     Birth control/protection: Pill       Immunizations:   Immunization History   Administered Date(s) Administered    DTaP 1999, 2000, 2001, 2003    DTaP, Unspecified 2010    FluMist 2-49yrs 2015    Fluzone (or Fluarix & Flulaval for VFC) >6mos 10/15/2018    HPV Quadrivalent 2010, 2011, 06/15/2016, 2017    Hep A, 2  Dose 06/18/2007, 01/16/2008    Hep A, Unspecified 04/18/2007, 01/16/2008    Hepatitis B Adult/Adolescent IM 1999, 11/16/2000, 01/18/2001    Hib (PRP-OMP) 1999, 11/16/2000    Hpv9 06/15/2016, 08/08/2016, 01/05/2017    IPV 1999, 11/16/2000, 01/18/2001, 07/17/2003    Influenza LAIV (Nasal) 09/29/2011, 11/06/2012    Influenza, Unspecified 10/15/2018, 09/11/2019    MMR 11/16/2000, 01/18/2001    Meningococcal MCV4P (Menactra) 08/04/2010, 06/15/2016    PEDS-Pneumococcal Conjugate (PCV7) 08/04/2010    PPD Test 09/15/2016    Tdap 08/04/2010, 01/02/2020, 04/01/2024    Varicella 07/10/2002, 08/29/2011        Medications:     Current Outpatient Medications:     Glucose Blood (Blood Glucose Test) strip, 1 each by In Vitro route 4 (Four) Times a Day., Disp: 150 each, Rfl: 11    Lancets (onetouch ultrasoft) lancets, Check blood glucose 30 minutes before each meal and before bed., Disp: 120 each, Rfl: 11    Mirena, 52 MG, 20 MCG/DAY intrauterine device IUD, To be inserted one time by prescriber. Route intrauterine., Disp: , Rfl:     ondansetron ODT (ZOFRAN-ODT) 4 MG disintegrating tablet, Take 1 tablet by mouth As Needed., Disp: , Rfl:     Cholecalciferol (Vitamin D) 50 MCG (2000 UT) tablet, Take 1 tablet by mouth Daily. (Patient not taking: Reported on 6/21/2024), Disp: 90 tablet, Rfl: 1    empagliflozin (Jardiance) 25 MG tablet tablet, Take 1 tablet by mouth Daily., Disp: 90 tablet, Rfl: 0    escitalopram (Lexapro) 10 MG tablet, Take 1 tablet by mouth Daily. (Patient not taking: Reported on 6/21/2024), Disp: 90 tablet, Rfl: 1    Ferrous Sulfate (IRON PO), Take 1 tablet by mouth Daily. (Patient not taking: Reported on 6/14/2024), Disp: , Rfl:     hydrOXYzine (ATARAX) 25 MG tablet, Take 1 tablet by mouth 3 (Three) Times a Day As Needed for Itching. (Patient not taking: Reported on 6/21/2024), Disp: 90 tablet, Rfl: 1    Semaglutide,0.25 or 0.5MG/DOS, (Ozempic, 0.25 or 0.5 MG/DOSE,) 2 MG/3ML solution pen-injector,  "Inject 0.25 mg under the skin into the appropriate area as directed 1 (One) Time Per Week., Disp: 3 mL, Rfl: 0    Allergies:   Allergies   Allergen Reactions    Bee Venom Swelling    Mushroom Swelling       Objective     Vital Signs  /58   Pulse 86   Temp 96.8 °F (36 °C) (Temporal)   Ht 167 cm (65.75\")   Wt 96.3 kg (212 lb 3.2 oz)   SpO2 96%   BMI 34.51 kg/m²   Estimated body mass index is 34.51 kg/m² as calculated from the following:    Height as of this encounter: 167 cm (65.75\").    Weight as of this encounter: 96.3 kg (212 lb 3.2 oz).    BMI is >= 30 and <35. (Class 1 Obesity). The following options were offered after discussion;: weight loss educational material (shared in after visit summary), exercise counseling/recommendations, and nutrition counseling/recommendations      Physical Exam  Constitutional:       Appearance: Normal appearance. She is obese.   Cardiovascular:      Rate and Rhythm: Normal rate and regular rhythm.      Pulses: Normal pulses.      Heart sounds: Normal heart sounds.   Pulmonary:      Effort: Pulmonary effort is normal.      Breath sounds: Normal breath sounds.   Neurological:      Mental Status: She is alert.          Result Review :     Common labs          9/18/2023    15:56 6/14/2024    11:10 6/14/2024    11:29   Common Labs   Glucose  293     BUN  8     Creatinine  0.66     Sodium  133     Potassium  4.0     Chloride  99     Calcium  9.1     Albumin  3.7     Total Bilirubin  0.4     Alkaline Phosphatase  93     AST (SGOT)  32     ALT (SGPT)  51     WBC  5.75     Hemoglobin  11.5     Hematocrit  36.9     Platelets  285     Total Cholesterol  158     Triglycerides  139     HDL Cholesterol  39     LDL Cholesterol   94     Hemoglobin A1C 10.1  8.90     Microalbumin, Urine   <1.2                 Assessment and Plan     1. Type 2 diabetes mellitus with hyperglycemia, unspecified whether long term insulin use  Restart jardiance, d/c metformin  Will start ozempic   Continue " diet and lifestyle modifications as prescribed. Encouraged patient to maintain a diabetic diet, Increase lean protein and vegetable intake. Avoid sugary drinks and processed carbs including crackers, cookies, cakes. Recommend at least 30 minutes of exercise daily, at least 5 days per week. Increase exercise gradually. Blood glucose goal <150 fasting, <180 2 hr postprandial. Diabetic complications discussed. Annual eye examinations at Ophthalmology discussed, dental hygiene discussed and foot care reviewed., home glucose monitoring emphasized, all medications, side effects and compliance discussed carefully and Hypoglycemia management and prevention reviewed. Reviewed ‘ABCs’ of diabetes manageme    - Hemoglobin A1c; Future  - MicroAlbumin, Urine, Random - Urine, Clean Catch; Future    2. Fatigue, unspecified type    - CBC Auto Differential  - Lipid Panel; Future  - Vitamin D,25-Hydroxy; Future  - Hemoglobin A1c; Future  - Comprehensive Metabolic Panel  - TSH Rfx On Abnormal To Free T4; Future  - Iron and TIBC; Future  - MicroAlbumin, Urine, Random - Urine, Clean Catch; Future    3. Anxiety  Start lexapro    .  Advised may take 4-6 weeks to notice an effect.  Discussed potential side effects including headache, dizziness, GI symptoms, sexual side effects,  worsening mood or suicidal ideations.  Patient advised to call the office if develops any side effects or has questions. Reassess in one month.     - Vitamin D,25-Hydroxy; Future  - TSH Rfx On Abnormal To Free T4; Future  - Iron and TIBC; Future  - MicroAlbumin, Urine, Random - Urine, Clean Catch; Future    4. Class 1 obesity due to excess calories without serious comorbidity with body mass index (BMI) of 34.0 to 34.9 in adult  Discussed risk associated with obesity. she was given instructions on calorie counting as well as on decreasing carbohydrate intake. she was also encouraged to start exercise regimen.  Instructed patient to download fitness casey on her smart  phone to aid in calorie counting and exercise tracking.         Follow Up  No follow-ups on file.    MD EVETTE [facility] [address]  [facility]  [address]  [address] [address] [phone]

## 2024-06-15 LAB
ALBUMIN SERPL-MCNC: 3.7 G/DL (ref 3.5–5.2)
ALBUMIN UR-MCNC: <1.2 MG/DL
ALBUMIN/GLOB SERPL: 0.9 G/DL
ALP SERPL-CCNC: 93 U/L (ref 39–117)
ALT SERPL W P-5'-P-CCNC: 51 U/L (ref 1–33)
ANION GAP SERPL CALCULATED.3IONS-SCNC: 13.2 MMOL/L (ref 5–15)
AST SERPL-CCNC: 32 U/L (ref 1–32)
BILIRUB SERPL-MCNC: 0.4 MG/DL (ref 0–1.2)
BUN SERPL-MCNC: 8 MG/DL (ref 6–20)
BUN/CREAT SERPL: 12.1 (ref 7–25)
CALCIUM SPEC-SCNC: 9.1 MG/DL (ref 8.6–10.5)
CHLORIDE SERPL-SCNC: 99 MMOL/L (ref 98–107)
CHOLEST SERPL-MCNC: 158 MG/DL (ref 0–200)
CO2 SERPL-SCNC: 20.8 MMOL/L (ref 22–29)
CREAT SERPL-MCNC: 0.66 MG/DL (ref 0.57–1)
EGFRCR SERPLBLD CKD-EPI 2021: 125.8 ML/MIN/1.73
GLOBULIN UR ELPH-MCNC: 4.3 GM/DL
GLUCOSE SERPL-MCNC: 293 MG/DL (ref 65–99)
HBA1C MFR BLD: 8.9 % (ref 4.8–5.6)
HDLC SERPL-MCNC: 39 MG/DL (ref 40–60)
IRON 24H UR-MRATE: 75 MCG/DL (ref 37–145)
IRON SATN MFR SERPL: 23 % (ref 20–50)
LDLC SERPL CALC-MCNC: 94 MG/DL (ref 0–100)
LDLC/HDLC SERPL: 2.34 {RATIO}
POTASSIUM SERPL-SCNC: 4 MMOL/L (ref 3.5–5.2)
PROT SERPL-MCNC: 8 G/DL (ref 6–8.5)
SODIUM SERPL-SCNC: 133 MMOL/L (ref 136–145)
TIBC SERPL-MCNC: 332 MCG/DL (ref 298–536)
TRANSFERRIN SERPL-MCNC: 223 MG/DL (ref 200–360)
TRIGL SERPL-MCNC: 139 MG/DL (ref 0–150)
TSH SERPL DL<=0.05 MIU/L-ACNC: 1.84 UIU/ML (ref 0.27–4.2)
VLDLC SERPL-MCNC: 25 MG/DL (ref 5–40)

## 2024-06-17 DIAGNOSIS — E55.9 VITAMIN D INSUFFICIENCY: Primary | ICD-10-CM

## 2024-06-17 DIAGNOSIS — E11.65 TYPE 2 DIABETES MELLITUS WITH HYPERGLYCEMIA, WITHOUT LONG-TERM CURRENT USE OF INSULIN: Primary | ICD-10-CM

## 2024-06-17 DIAGNOSIS — R07.9 CHEST PAIN, UNSPECIFIED TYPE: Primary | ICD-10-CM

## 2024-06-17 DIAGNOSIS — R07.89 CHEST WALL DISCOMFORT: Primary | ICD-10-CM

## 2024-06-17 RX ORDER — HYDROXYZINE HYDROCHLORIDE 25 MG/1
25 TABLET, FILM COATED ORAL 3 TIMES DAILY PRN
Qty: 90 TABLET | Refills: 0 | Status: SHIPPED | OUTPATIENT
Start: 2024-06-17 | End: 2024-06-17

## 2024-06-17 RX ORDER — SEMAGLUTIDE 0.68 MG/ML
0.25 INJECTION, SOLUTION SUBCUTANEOUS WEEKLY
Qty: 3 ML | Refills: 0 | Status: SHIPPED | OUTPATIENT
Start: 2024-06-17

## 2024-06-17 RX ORDER — ESCITALOPRAM OXALATE 10 MG/1
10 TABLET ORAL DAILY
Qty: 90 TABLET | Refills: 1 | Status: SHIPPED | OUTPATIENT
Start: 2024-06-17

## 2024-06-17 RX ORDER — CHOLECALCIFEROL (VITAMIN D3) 50 MCG
2000 TABLET ORAL DAILY
Qty: 90 TABLET | Refills: 1 | Status: SHIPPED | OUTPATIENT
Start: 2024-06-17 | End: 2025-06-17

## 2024-06-17 RX ORDER — ESCITALOPRAM OXALATE 10 MG/1
10 TABLET ORAL DAILY
Qty: 90 TABLET | Refills: 0 | Status: SHIPPED | OUTPATIENT
Start: 2024-06-17 | End: 2024-06-17

## 2024-06-17 RX ORDER — HYDROXYZINE HYDROCHLORIDE 25 MG/1
25 TABLET, FILM COATED ORAL 3 TIMES DAILY PRN
Qty: 90 TABLET | Refills: 1 | Status: SHIPPED | OUTPATIENT
Start: 2024-06-17

## 2024-06-21 ENCOUNTER — HOSPITAL ENCOUNTER (OUTPATIENT)
Dept: CARDIOLOGY | Facility: HOSPITAL | Age: 24
Discharge: HOME | End: 2024-06-21
Payer: COMMERCIAL

## 2024-06-21 ENCOUNTER — OFFICE VISIT (OUTPATIENT)
Dept: CARDIOLOGY | Facility: HOSPITAL | Age: 24
End: 2024-06-21
Payer: COMMERCIAL

## 2024-06-21 VITALS
HEART RATE: 111 BPM | BODY MASS INDEX: 33.59 KG/M2 | DIASTOLIC BLOOD PRESSURE: 65 MMHG | WEIGHT: 209 LBS | SYSTOLIC BLOOD PRESSURE: 107 MMHG | OXYGEN SATURATION: 98 % | HEIGHT: 66 IN

## 2024-06-21 DIAGNOSIS — R06.09 DYSPNEA ON EXERTION: ICD-10-CM

## 2024-06-21 DIAGNOSIS — R07.89 OTHER CHEST PAIN: Primary | ICD-10-CM

## 2024-06-21 DIAGNOSIS — R07.89 OTHER CHEST PAIN: ICD-10-CM

## 2024-06-21 PROCEDURE — 99213 OFFICE O/P EST LOW 20 MIN: CPT | Performed by: NURSE PRACTITIONER

## 2024-06-21 PROCEDURE — 93005 ELECTROCARDIOGRAM TRACING: CPT | Performed by: NURSE PRACTITIONER

## 2024-06-22 LAB
QT INTERVAL: 380 MS
QTC INTERVAL: 454 MS

## 2024-07-12 DIAGNOSIS — E11.65 TYPE 2 DIABETES MELLITUS WITH HYPERGLYCEMIA, WITHOUT LONG-TERM CURRENT USE OF INSULIN: ICD-10-CM

## 2024-07-12 RX ORDER — SEMAGLUTIDE 0.68 MG/ML
0.25 INJECTION, SOLUTION SUBCUTANEOUS WEEKLY
Qty: 3 ML | Refills: 0 | Status: SHIPPED | OUTPATIENT
Start: 2024-07-12

## 2024-07-12 NOTE — TELEPHONE ENCOUNTER
Rx Refill Note  Requested Prescriptions     Pending Prescriptions Disp Refills    Ozempic, 0.25 or 0.5 MG/DOSE, 2 MG/3ML solution pen-injector [Pharmacy Med Name: OZEMPIC 0.25-0.5 MG/DOSE PEN]       Sig: INJECT 0.25 MG UNDER THE SKIN INTO THE APPROPRIATE AREA AS DIRECTED 1 (ONE) TIME PER WEEK.      Last office visit with prescribing clinician: 6/14/2024   Last telemedicine visit with prescribing clinician: Visit date not found   Next office visit with prescribing clinician: 7/22/2024       Leslie Rincon MA  07/12/24, 08:15 EDT

## 2024-07-22 ENCOUNTER — OFFICE VISIT (OUTPATIENT)
Dept: INTERNAL MEDICINE | Facility: CLINIC | Age: 24
End: 2024-07-22
Payer: COMMERCIAL

## 2024-07-22 VITALS
DIASTOLIC BLOOD PRESSURE: 60 MMHG | OXYGEN SATURATION: 99 % | BODY MASS INDEX: 33.23 KG/M2 | SYSTOLIC BLOOD PRESSURE: 98 MMHG | TEMPERATURE: 96.9 F | HEIGHT: 66 IN | WEIGHT: 206.8 LBS | HEART RATE: 67 BPM

## 2024-07-22 DIAGNOSIS — F41.9 ANXIETY: ICD-10-CM

## 2024-07-22 DIAGNOSIS — E11.65 TYPE 2 DIABETES MELLITUS WITH HYPERGLYCEMIA, WITHOUT LONG-TERM CURRENT USE OF INSULIN: Primary | ICD-10-CM

## 2024-07-22 DIAGNOSIS — H65.193 ACUTE MEE (MIDDLE EAR EFFUSION), BILATERAL: ICD-10-CM

## 2024-07-22 DIAGNOSIS — E55.9 VITAMIN D INSUFFICIENCY: ICD-10-CM

## 2024-07-22 PROCEDURE — 3052F HG A1C>EQUAL 8.0%<EQUAL 9.0%: CPT | Performed by: STUDENT IN AN ORGANIZED HEALTH CARE EDUCATION/TRAINING PROGRAM

## 2024-07-22 PROCEDURE — 1160F RVW MEDS BY RX/DR IN RCRD: CPT | Performed by: STUDENT IN AN ORGANIZED HEALTH CARE EDUCATION/TRAINING PROGRAM

## 2024-07-22 PROCEDURE — 99214 OFFICE O/P EST MOD 30 MIN: CPT | Performed by: STUDENT IN AN ORGANIZED HEALTH CARE EDUCATION/TRAINING PROGRAM

## 2024-07-22 PROCEDURE — 1159F MED LIST DOCD IN RCRD: CPT | Performed by: STUDENT IN AN ORGANIZED HEALTH CARE EDUCATION/TRAINING PROGRAM

## 2024-07-22 RX ORDER — PREDNISONE 20 MG/1
20 TABLET ORAL DAILY
Qty: 5 TABLET | Refills: 0 | Status: SHIPPED | OUTPATIENT
Start: 2024-07-22 | End: 2024-07-27

## 2024-07-22 RX ORDER — AMOXICILLIN AND CLAVULANATE POTASSIUM 875; 125 MG/1; MG/1
1 TABLET, FILM COATED ORAL 2 TIMES DAILY
Qty: 6 TABLET | Refills: 0 | Status: SHIPPED | OUTPATIENT
Start: 2024-07-22 | End: 2024-07-25

## 2024-07-22 RX ORDER — CHOLECALCIFEROL (VITAMIN D3) 50 MCG
2000 TABLET ORAL DAILY
Qty: 90 TABLET | Refills: 1 | Status: SHIPPED | OUTPATIENT
Start: 2024-07-22 | End: 2025-01-18

## 2024-07-22 NOTE — PROGRESS NOTES
Office Note     Name: Catalina Antunez    : 2000     MRN: [id number]     Chief Complaint  Anxiety (F/u ), Diabetes (F/u ), and Med Refill (Vitamin d /)    Subjective     History of Present Illness:  Catalina Antunez is a 24 y.o. female who presents today for     Follow up on anxiety, started taking lexapro 10mg.   Felt it helped with her anxiety and feels better. The chest pain/tightness she was getting has improved.  She still did follow up with cardiology, but has not completed the cardiac workup.     Type II DM  She is on jardiance 10mg daily and ozempic 0.25mg qweekly. She is tolerating the medications.  Has made improvement in her diets, focusing on low-carb, high protein.    She is about 3 months post-partum, baby is doing well. She is no longer breastfeeding  Working at [employer].     Does have seasonal allergies and was having bilateral ear fullness this past week.      Review of Systems:   Review of Systems   All other systems reviewed and are negative.      Past Medical History:   Past Medical History:   Diagnosis Date   • Diabetes mellitus 2023       Past Surgical History:   Past Surgical History:   Procedure Laterality Date   • CHOLECYSTECTOMY  2019       Family History:   Family History   Problem Relation Age of Onset   • Diabetes Mother        Social History:   Social History     Socioeconomic History   • Marital status:    Tobacco Use   • Smoking status: Never     Passive exposure: Never   • Smokeless tobacco: Never   Vaping Use   • Vaping status: Never Used   Substance and Sexual Activity   • Alcohol use: Yes     Alcohol/week: 2.0 standard drinks of alcohol     Types: 2 Cans of beer per week   • Drug use: Never   • Sexual activity: Yes     Partners: Male     Birth control/protection: Pill       Immunizations:   Immunization History   Administered Date(s) Administered   • DTaP 1999, 2000, 2001, 2003   • DTaP, Unspecified  08/04/2010   • FluMist 2-49yrs 11/16/2015   • Fluzone (or Fluarix & Flulaval for VFC) >6mos 10/15/2018   • HPV Quadrivalent 08/04/2010, 07/19/2011, 06/15/2016, 01/05/2017   • Hep A, 2 Dose 06/18/2007, 01/16/2008   • Hep A, Unspecified 04/18/2007, 01/16/2008   • Hepatitis B Adult/Adolescent IM 1999, 11/16/2000, 01/18/2001   • Hib (PRP-OMP) 1999, 11/16/2000   • Hpv9 06/15/2016, 08/08/2016, 01/05/2017   • IPV 1999, 11/16/2000, 01/18/2001, 07/17/2003   • Influenza LAIV (Nasal) 09/29/2011, 11/06/2012   • Influenza, Unspecified 10/15/2018, 09/11/2019   • MMR 11/16/2000, 01/18/2001   • Meningococcal MCV4P (Menactra) 08/04/2010, 06/15/2016   • PEDS-Pneumococcal Conjugate (PCV7) 08/04/2010   • PPD Test 09/15/2016   • Tdap 08/04/2010, 01/02/2020, 04/01/2024   • Varicella 07/10/2002, 08/29/2011        Medications:     Current Outpatient Medications:   •  empagliflozin (Jardiance) 25 MG tablet tablet, Take 1 tablet by mouth Daily., Disp: 90 tablet, Rfl: 0  •  escitalopram (Lexapro) 10 MG tablet, Take 1 tablet by mouth Daily., Disp: 90 tablet, Rfl: 1  •  Glucose Blood (Blood Glucose Test) strip, 1 each by In Vitro route 4 (Four) Times a Day., Disp: 150 each, Rfl: 11  •  hydrOXYzine (ATARAX) 25 MG tablet, Take 1 tablet by mouth 3 (Three) Times a Day As Needed for Itching., Disp: 90 tablet, Rfl: 1  •  Lancets (onetouch ultrasoft) lancets, Check blood glucose 30 minutes before each meal and before bed., Disp: 120 each, Rfl: 11  •  Mirena, 52 MG, 20 MCG/DAY intrauterine device IUD, To be inserted one time by prescriber. Route intrauterine., Disp: , Rfl:   •  ondansetron ODT (ZOFRAN-ODT) 4 MG disintegrating tablet, Take 1 tablet by mouth As Needed., Disp: , Rfl:   •  Semaglutide,0.25 or 0.5MG/DOS, (Ozempic, 0.25 or 0.5 MG/DOSE,) 2 MG/3ML solution pen-injector, INJECT 0.25 MG UNDER THE SKIN INTO THE APPROPRIATE AREA AS DIRECTED 1 (ONE) TIME PER WEEK., Disp: 3 mL, Rfl: 0  •  amoxicillin-clavulanate (AUGMENTIN) 960-944  "MG per tablet, Take 1 tablet by mouth 2 (Two) Times a Day for 3 days., Disp: 6 tablet, Rfl: 0  •  Cholecalciferol (Vitamin D) 50 MCG (2000 UT) tablet, Take 1 tablet by mouth Daily for 180 days., Disp: 90 tablet, Rfl: 1  •  Ferrous Sulfate (IRON PO), Take 1 tablet by mouth Daily. (Patient not taking: Reported on 6/14/2024), Disp: , Rfl:   •  predniSONE (DELTASONE) 20 MG tablet, Take 1 tablet by mouth Daily for 5 days. Sinus congestion, Disp: 5 tablet, Rfl: 0    Allergies:   Allergies   Allergen Reactions   • Bee Venom Swelling   • Mushroom Swelling       Objective     Vital Signs  BP 98/60   Pulse 67   Temp 96.9 °F (36.1 °C) (Temporal)   Ht 167.6 cm (65.98\")   Wt 93.8 kg (206 lb 12.8 oz)   SpO2 99%   BMI 33.39 kg/m²   Estimated body mass index is 33.39 kg/m² as calculated from the following:    Height as of this encounter: 167.6 cm (65.98\").    Weight as of this encounter: 93.8 kg (206 lb 12.8 oz).            Physical Exam  HENT:      Right Ear: A middle ear effusion is present.      Left Ear: A middle ear effusion is present.   Cardiovascular:      Rate and Rhythm: Normal rate and regular rhythm.      Pulses: Normal pulses.      Heart sounds: Normal heart sounds.   Pulmonary:      Effort: Pulmonary effort is normal.      Breath sounds: Normal breath sounds.        Result Review :     Common labs          9/18/2023    15:56 6/14/2024    11:10 6/14/2024    11:29   Common Labs   Glucose  293     BUN  8     Creatinine  0.66     Sodium  133     Potassium  4.0     Chloride  99     Calcium  9.1     Albumin  3.7     Total Bilirubin  0.4     Alkaline Phosphatase  93     AST (SGOT)  32     ALT (SGPT)  51     WBC  5.75     Hemoglobin  11.5     Hematocrit  36.9     Platelets  285     Total Cholesterol  158     Triglycerides  139     HDL Cholesterol  39     LDL Cholesterol   94     Hemoglobin A1C 10.1  8.90     Microalbumin, Urine   <1.2                 Assessment and Plan     1. Type 2 diabetes mellitus with " hyperglycemia, without long-term current use of insulin  Most recent A1c 8.9  Continue with jardiance 10mg daily,  Increase ozempic 0,5mg qweekly  Continue diet and lifestyle modifications as prescribed. Encouraged patient to maintain a diabetic diet, Increase lean protein and vegetable intake. Avoid sugary drinks and processed carbs including crackers, cookies, cakes. Recommend at least 30 minutes of exercise daily, at least 5 days per week. Increase exercise gradually. Blood glucose goal <150 fasting, <180 2 hr postprandial. Diabetic complications discussed. Annual eye examinations at Ophthalmology discussed, dental hygiene discussed and foot care reviewed., home glucose monitoring emphasized, all medications, side effects and compliance discussed carefully and Hypoglycemia management and prevention reviewed. Reviewed ‘ABCs’ of diabetes manageme    - Hemoglobin A1c; Future    2. Vitamin D insufficiency  Med refill  - Cholecalciferol (Vitamin D) 50 MCG (2000 UT) tablet; Take 1 tablet by mouth Daily for 180 days.  Dispense: 90 tablet; Refill: 1    3. Anxiety  Improved  Continue with lexapro 10mg daily    4. Acute SHAHZAD (middle ear effusion), bilateral    - predniSONE (DELTASONE) 20 MG tablet; Take 1 tablet by mouth Daily for 5 days. Sinus congestion  Dispense: 5 tablet; Refill: 0  - amoxicillin-clavulanate (AUGMENTIN) 875-125 MG per tablet; Take 1 tablet by mouth 2 (Two) Times a Day for 3 days.  Dispense: 6 tablet; Refill: 0       Follow Up  No follow-ups on file.    Sung Dejesus MD  [facility] [address]  [facility]  [address]  [address] [address] [phone]

## 2024-08-20 DIAGNOSIS — E11.65 TYPE 2 DIABETES MELLITUS WITH HYPERGLYCEMIA, WITHOUT LONG-TERM CURRENT USE OF INSULIN: Primary | ICD-10-CM

## 2024-08-20 RX ORDER — HYDROXYZINE HYDROCHLORIDE 25 MG/1
TABLET, FILM COATED ORAL
Qty: 90 TABLET | Refills: 1 | Status: SHIPPED | OUTPATIENT
Start: 2024-08-20

## 2024-08-20 RX ORDER — SEMAGLUTIDE 0.68 MG/ML
0.5 INJECTION, SOLUTION SUBCUTANEOUS WEEKLY
Qty: 3 ML | Refills: 0 | Status: SHIPPED | OUTPATIENT
Start: 2024-08-20

## 2024-08-20 NOTE — TELEPHONE ENCOUNTER
Rx Refill Note  Requested Prescriptions     Pending Prescriptions Disp Refills    hydrOXYzine (ATARAX) 25 MG tablet [Pharmacy Med Name: HYDROXYZINE HCL 25 MG TABLET] 90 tablet 1     Sig: TAKE 1 TABLET BY MOUTH THREE TIMES A DAY AS NEEDED FOR ITCHING      Last office visit with prescribing clinician: 7/22/2024   Last telemedicine visit with prescribing clinician: Visit date not found   Next office visit with prescribing clinician: Visit date not found       Pati Dooley MA  08/20/24, 10:34 EDT

## 2024-09-13 DIAGNOSIS — E11.65 TYPE 2 DIABETES MELLITUS WITH HYPERGLYCEMIA, WITHOUT LONG-TERM CURRENT USE OF INSULIN: ICD-10-CM

## 2024-09-13 RX ORDER — EMPAGLIFLOZIN 25 MG/1
25 TABLET, FILM COATED ORAL DAILY
Qty: 90 TABLET | Refills: 0 | Status: SHIPPED | OUTPATIENT
Start: 2024-09-13

## 2024-10-15 ENCOUNTER — OFFICE VISIT (OUTPATIENT)
Dept: INTERNAL MEDICINE | Facility: CLINIC | Age: 24
End: 2024-10-15
Payer: COMMERCIAL

## 2024-10-15 ENCOUNTER — LAB (OUTPATIENT)
Dept: INTERNAL MEDICINE | Facility: CLINIC | Age: 24
End: 2024-10-15
Payer: COMMERCIAL

## 2024-10-15 VITALS
BODY MASS INDEX: 32.82 KG/M2 | WEIGHT: 204.2 LBS | HEIGHT: 66 IN | OXYGEN SATURATION: 98 % | DIASTOLIC BLOOD PRESSURE: 72 MMHG | SYSTOLIC BLOOD PRESSURE: 100 MMHG | HEART RATE: 68 BPM | TEMPERATURE: 97.5 F | RESPIRATION RATE: 16 BRPM

## 2024-10-15 DIAGNOSIS — E55.9 VITAMIN D INSUFFICIENCY: ICD-10-CM

## 2024-10-15 DIAGNOSIS — R10.13 EPIGASTRIC PAIN: ICD-10-CM

## 2024-10-15 DIAGNOSIS — N89.8 VAGINAL IRRITATION: ICD-10-CM

## 2024-10-15 DIAGNOSIS — R11.2 NAUSEA AND VOMITING, UNSPECIFIED VOMITING TYPE: ICD-10-CM

## 2024-10-15 DIAGNOSIS — R19.7 DIARRHEA, UNSPECIFIED TYPE: ICD-10-CM

## 2024-10-15 DIAGNOSIS — N89.8 VAGINAL DISCHARGE: ICD-10-CM

## 2024-10-15 DIAGNOSIS — R10.13 EPIGASTRIC PAIN: Primary | ICD-10-CM

## 2024-10-15 DIAGNOSIS — E11.65 TYPE 2 DIABETES MELLITUS WITH HYPERGLYCEMIA, WITHOUT LONG-TERM CURRENT USE OF INSULIN: ICD-10-CM

## 2024-10-15 LAB
B-HCG UR QL: NEGATIVE
BILIRUB BLD-MCNC: ABNORMAL MG/DL
CLARITY, POC: CLEAR
COLOR UR: ABNORMAL
DEPRECATED RDW RBC AUTO: 41.8 FL (ref 37–54)
ERYTHROCYTE [DISTWIDTH] IN BLOOD BY AUTOMATED COUNT: 13.2 % (ref 12.3–15.4)
EXPIRATION DATE: ABNORMAL
EXPIRATION DATE: NORMAL
GLUCOSE UR STRIP-MCNC: NEGATIVE MG/DL
HCT VFR BLD AUTO: 42 % (ref 34–46.6)
HGB BLD-MCNC: 13.5 G/DL (ref 12–15.9)
INTERNAL NEGATIVE CONTROL: NORMAL
INTERNAL POSITIVE CONTROL: NORMAL
KETONES UR QL: NEGATIVE
LEUKOCYTE EST, POC: ABNORMAL
Lab: ABNORMAL
Lab: NORMAL
MCH RBC QN AUTO: 28 PG (ref 26.6–33)
MCHC RBC AUTO-ENTMCNC: 32.1 G/DL (ref 31.5–35.7)
MCV RBC AUTO: 87 FL (ref 79–97)
NITRITE UR-MCNC: NEGATIVE MG/ML
PH UR: 6 [PH] (ref 5–8)
PLATELET # BLD AUTO: 322 10*3/MM3 (ref 140–450)
PMV BLD AUTO: 10.2 FL (ref 6–12)
PROT UR STRIP-MCNC: ABNORMAL MG/DL
RBC # BLD AUTO: 4.83 10*6/MM3 (ref 3.77–5.28)
RBC # UR STRIP: NEGATIVE /UL
SP GR UR: 1.02 (ref 1–1.03)
UROBILINOGEN UR QL: NORMAL
WBC NRBC COR # BLD AUTO: 8.72 10*3/MM3 (ref 3.4–10.8)

## 2024-10-15 PROCEDURE — 3052F HG A1C>EQUAL 8.0%<EQUAL 9.0%: CPT

## 2024-10-15 PROCEDURE — 36415 COLL VENOUS BLD VENIPUNCTURE: CPT

## 2024-10-15 PROCEDURE — 1160F RVW MEDS BY RX/DR IN RCRD: CPT

## 2024-10-15 PROCEDURE — 83690 ASSAY OF LIPASE: CPT

## 2024-10-15 PROCEDURE — 87798 DETECT AGENT NOS DNA AMP: CPT

## 2024-10-15 PROCEDURE — 87661 TRICHOMONAS VAGINALIS AMPLIF: CPT

## 2024-10-15 PROCEDURE — 85027 COMPLETE CBC AUTOMATED: CPT

## 2024-10-15 PROCEDURE — 81025 URINE PREGNANCY TEST: CPT

## 2024-10-15 PROCEDURE — 83013 H PYLORI (C-13) BREATH: CPT

## 2024-10-15 PROCEDURE — 99214 OFFICE O/P EST MOD 30 MIN: CPT

## 2024-10-15 PROCEDURE — 80053 COMPREHEN METABOLIC PANEL: CPT

## 2024-10-15 PROCEDURE — 1159F MED LIST DOCD IN RCRD: CPT

## 2024-10-15 PROCEDURE — 87801 DETECT AGNT MULT DNA AMPLI: CPT

## 2024-10-15 PROCEDURE — 87491 CHLMYD TRACH DNA AMP PROBE: CPT

## 2024-10-15 PROCEDURE — 87591 N.GONORRHOEAE DNA AMP PROB: CPT

## 2024-10-15 PROCEDURE — 87086 URINE CULTURE/COLONY COUNT: CPT

## 2024-10-15 PROCEDURE — 82150 ASSAY OF AMYLASE: CPT

## 2024-10-15 PROCEDURE — 83036 HEMOGLOBIN GLYCOSYLATED A1C: CPT

## 2024-10-15 RX ORDER — PROMETHAZINE HYDROCHLORIDE 25 MG/1
25 TABLET ORAL EVERY 6 HOURS PRN
Qty: 60 TABLET | Refills: 1 | Status: SHIPPED | OUTPATIENT
Start: 2024-10-15

## 2024-10-15 RX ORDER — CHOLECALCIFEROL (VITAMIN D3) 50 MCG
2000 TABLET ORAL DAILY
Qty: 90 TABLET | Refills: 1 | Status: SHIPPED | OUTPATIENT
Start: 2024-10-15 | End: 2025-04-13

## 2024-10-15 RX ORDER — LOPERAMIDE HCL 2 MG
2 CAPSULE ORAL 4 TIMES DAILY PRN
Qty: 60 CAPSULE | Refills: 1 | Status: SHIPPED | OUTPATIENT
Start: 2024-10-15

## 2024-10-15 NOTE — PROGRESS NOTES
Office Note     Name: Catalina Antunez    : 2000     MRN: [id number]     Chief Complaint  Diarrhea, Abdominal Pain (Upper abdomen /), Vomiting, and Chills    Subjective     History of Present Illness:  History of Present Illness      Catalina Antunez is a 24 y.o. female who presents today for:     States last week she developed mid epigastric pain with nausea, vomiting and diarrhea. States she has had the vomiting and diarrhea almost every day since symptoms started. She also reports some burping and reflux, took omeprazole, peptobismol and zofran which did not help her symptoms at all. She has not been able to eat/drink much.     She is unsure if she has had fevers but states she had chills and felt warm. She tested herself for flu and covid and it was negative.     She also c/o some vaginal itching/foul odor and discharge for the past few days. Denies dysuria, hematuria, urinary urgency or frequency      LMP-last month, she is sexually active but has IUD.       Past Medical History:   Diagnosis Date    Diabetes mellitus 2023     Past Surgical History:   Procedure Laterality Date    CHOLECYSTECTOMY  2019       Current Outpatient Medications:     Cholecalciferol (Vitamin D) 50 MCG (2000) tablet, Take 1 tablet by mouth Daily for 180 days., Disp: 90 tablet, Rfl: 1    escitalopram (Lexapro) 10 MG tablet, Take 1 tablet by mouth Daily., Disp: 90 tablet, Rfl: 1    Glucose Blood (Blood Glucose Test) strip, 1 each by In Vitro route 4 (Four) Times a Day., Disp: 150 each, Rfl: 11    hydrOXYzine (ATARAX) 25 MG tablet, TAKE 1 TABLET BY MOUTH THREE TIMES A DAY AS NEEDED FOR ITCHING, Disp: 90 tablet, Rfl: 1    Jardiance 25 MG tablet tablet, TAKE 1 TABLET BY MOUTH EVERY DAY, Disp: 90 tablet, Rfl: 0    Lancets (onetouch ultrasoft) lancets, Check blood glucose 30 minutes before each meal and before bed., Disp: 120 each, Rfl: 11    VETO ROOT PO, Take  by mouth., Disp: , Rfl:     Mirena, 52 MG,  "20 MCG/DAY intrauterine device IUD, To be inserted one time by prescriber. Route intrauterine., Disp: , Rfl:     ondansetron ODT (ZOFRAN-ODT) 4 MG disintegrating tablet, Take 1 tablet by mouth As Needed., Disp: , Rfl:     Semaglutide,0.25 or 0.5MG/DOS, (Ozempic, 0.25 or 0.5 MG/DOSE,) 2 MG/3ML solution pen-injector, INJECT 0.25 MG UNDER THE SKIN INTO THE APPROPRIATE AREA AS DIRECTED 1 (ONE) TIME PER WEEK., Disp: 3 mL, Rfl: 0    Semaglutide,0.25 or 0.5MG/DOS, (Ozempic, 0.25 or 0.5 MG/DOSE,) 2 MG/3ML solution pen-injector, Inject 0.5 mg under the skin into the appropriate area as directed 1 (One) Time Per Week., Disp: 3 mL, Rfl: 0    loperamide (IMODIUM) 2 MG capsule, Take 1 capsule by mouth 4 (Four) Times a Day As Needed for Diarrhea., Disp: 60 capsule, Rfl: 1    promethazine (PHENERGAN) 25 MG tablet, Take 1 tablet by mouth Every 6 (Six) Hours As Needed for Nausea or Vomiting., Disp: 60 tablet, Rfl: 1  Allergies   Allergen Reactions    Bee Venom Swelling    Mushroom Swelling       Objective     Vital Signs  /72 (BP Location: Left arm, Patient Position: Sitting, Cuff Size: Adult)   Pulse 68   Temp 97.5 °F (36.4 °C) (Infrared)   Resp 16   Ht 167.6 cm (65.98\")   Wt 92.6 kg (204 lb 3.2 oz)   SpO2 98%   BMI 32.97 kg/m²   Estimated body mass index is 32.97 kg/m² as calculated from the following:    Height as of this encounter: 167.6 cm (65.98\").    Weight as of this encounter: 92.6 kg (204 lb 3.2 oz).            Physical Exam    Physical Exam  Constitutional:       Appearance: Normal appearance. She is not ill-appearing.   Cardiovascular:      Rate and Rhythm: Normal rate and regular rhythm.      Pulses: Normal pulses.      Heart sounds: Normal heart sounds. No murmur heard.  Pulmonary:      Effort: Pulmonary effort is normal. No respiratory distress.   Abdominal:      Palpations: Abdomen is soft.      Tenderness: There is abdominal tenderness in the epigastric area.   Neurological:      General: No focal " deficit present.      Mental Status: She is alert and oriented to person, place, and time. Mental status is at baseline.   Psychiatric:         Mood and Affect: Mood normal.         Behavior: Behavior normal.         Thought Content: Thought content normal.         Judgment: Judgment normal.          Results      Results for orders placed or performed in visit on 10/15/24   POC Pregnancy, Urine    Collection Time: 10/15/24 12:16 PM    Specimen: Urine   Result Value Ref Range    HCG, Urine, QL Negative Negative    Lot Number [id number]     Internal Positive Control Passed Positive, Passed    Internal Negative Control Passed Negative, Passed    Expiration Date 05-    POC Urinalysis Dipstick, Automated    Collection Time: 10/15/24 12:16 PM    Specimen: Urine   Result Value Ref Range    Color Dark Yellow Yellow, Straw, Dark Yellow, Brielle    Clarity, UA Clear Clear    Specific Gravity  1.025 1.005 - 1.030    pH, Urine 6.0 5.0 - 8.0    Leukocytes Trace (A) Negative    Nitrite, UA Negative Negative    Protein, POC Trace (A) Negative mg/dL    Glucose, UA Negative Negative mg/dL    Ketones, UA Negative Negative    Urobilinogen, UA Normal Normal, 0.2 E.U./dL    Bilirubin Large (3+) (A) Negative    Blood, UA Negative Negative    Lot Number [id number]     Expiration Date 03-               Assessment and Plan     Diagnoses and all orders for this visit:    1. Epigastric pain (Primary)  -     POC Urinalysis Dipstick, Automated  -     H. Pylori Breath Test - Breath, Lung; Future  -     Comprehensive Metabolic Panel; Future  -     CBC No Differential; Future  -     Lipase; Future  -     Amylase; Future  -     Urine Culture - Urine, Urine, Clean Catch; Future    2. Vitamin D insufficiency  -     Cholecalciferol (Vitamin D) 50 MCG (2000 UT) tablet; Take 1 tablet by mouth Daily for 180 days.  Dispense: 90 tablet; Refill: 1    3. Nausea and vomiting, unspecified vomiting type  -     POC Pregnancy, Urine  -     H. Pylori  Breath Test - Breath, Lung; Future  -     promethazine (PHENERGAN) 25 MG tablet; Take 1 tablet by mouth Every 6 (Six) Hours As Needed for Nausea or Vomiting.  Dispense: 60 tablet; Refill: 1  -     Comprehensive Metabolic Panel; Future  -     CBC No Differential; Future  -     Lipase; Future  -     Amylase; Future  -     Urine Culture - Urine, Urine, Clean Catch; Future    4. Diarrhea, unspecified type  -     POC Urinalysis Dipstick, Automated  -     Gastrointestinal Panel, PCR - Stool, Per Rectum  -     Stool Culture (Reference Lab) - Stool, Per Rectum; Future  -     loperamide (IMODIUM) 2 MG capsule; Take 1 capsule by mouth 4 (Four) Times a Day As Needed for Diarrhea.  Dispense: 60 capsule; Refill: 1  -     Comprehensive Metabolic Panel; Future  -     CBC No Differential; Future  -     Lipase; Future  -     Amylase; Future    5. Vaginal irritation  -     NuSwab BV & Candida - Swab, Vagina; Future  -     Chlamydia trachomatis, Neisseria gonorrhoeae, Trichomonas vaginalis, PCR - Urine, Urine, Clean Catch; Future  -     NuSwab BV & Candida - Swab, Vagina  -     Chlamydia trachomatis, Neisseria gonorrhoeae, Trichomonas vaginalis, PCR - Urine, Urine, Clean Catch    6. Vaginal discharge  -     NuSwab BV & Candida - Swab, Vagina; Future  -     Chlamydia trachomatis, Neisseria gonorrhoeae, Trichomonas vaginalis, PCR - Urine, Urine, Clean Catch; Future  -     NuSwab BV & Candida - Swab, Vagina  -     Chlamydia trachomatis, Neisseria gonorrhoeae, Trichomonas vaginalis, PCR - Urine, Urine, Clean Catch    7. Type 2 diabetes mellitus with hyperglycemia, without long-term current use of insulin  -     Hemoglobin A1c; Future    POC preg negative  UA + for trace leuks. Will send for culture  STD testing and NuSwab obtained  Labs and stool studies ordered  Will order H.Pylori breath test. Pt instructed to RTC next week to have completed since she did take omeprazole and peptobismol last week. She is to not take these medications  until H.Pylori breath test has been done  D/C zofran. Will send phenergan to pharmacy  Can take imodium for diarrhea.  Encouraged adequate hydration to prevent dehydration   Assessment & Plan       If labs or images are ordered we will contact you with the results within the next week.  If you have not heard from us after a week please call our office to inquire about the results.    Follow Up  Return for Next scheduled follow up with PCP.    Jessica Dougherty, APRN

## 2024-10-16 ENCOUNTER — TELEPHONE (OUTPATIENT)
Dept: INTERNAL MEDICINE | Facility: CLINIC | Age: 24
End: 2024-10-16
Payer: COMMERCIAL

## 2024-10-16 LAB
ALBUMIN SERPL-MCNC: 3.6 G/DL (ref 3.5–5.2)
ALBUMIN/GLOB SERPL: 0.9 G/DL
ALP SERPL-CCNC: 90 U/L (ref 39–117)
ALT SERPL W P-5'-P-CCNC: 23 U/L (ref 1–33)
AMYLASE SERPL-CCNC: 30 U/L (ref 28–100)
ANION GAP SERPL CALCULATED.3IONS-SCNC: 9.4 MMOL/L (ref 5–15)
AST SERPL-CCNC: 17 U/L (ref 1–32)
BACTERIA SPEC AEROBE CULT: NORMAL
BILIRUB SERPL-MCNC: 0.2 MG/DL (ref 0–1.2)
BUN SERPL-MCNC: 9 MG/DL (ref 6–20)
BUN/CREAT SERPL: 9.7 (ref 7–25)
CALCIUM SPEC-SCNC: 9.2 MG/DL (ref 8.6–10.5)
CHLORIDE SERPL-SCNC: 104 MMOL/L (ref 98–107)
CO2 SERPL-SCNC: 22.6 MMOL/L (ref 22–29)
CREAT SERPL-MCNC: 0.93 MG/DL (ref 0.57–1)
EGFRCR SERPLBLD CKD-EPI 2021: 87.7 ML/MIN/1.73
GLOBULIN UR ELPH-MCNC: 3.8 GM/DL
GLUCOSE SERPL-MCNC: 97 MG/DL (ref 65–99)
HBA1C MFR BLD: 7.6 % (ref 4.8–5.6)
LIPASE SERPL-CCNC: 12 U/L (ref 13–60)
POTASSIUM SERPL-SCNC: 3.6 MMOL/L (ref 3.5–5.2)
PROT SERPL-MCNC: 7.4 G/DL (ref 6–8.5)
SODIUM SERPL-SCNC: 136 MMOL/L (ref 136–145)

## 2024-10-16 NOTE — TELEPHONE ENCOUNTER
Caller: aCtalina Antunez    Relationship: Self    Best call back number: [phone]     Caller requesting test results: PATIENT    What test was performed: LABS    When was the test performed: 10/15/24    Where was the test performed: OFFICE

## 2024-10-16 NOTE — TELEPHONE ENCOUNTER
Attempted to contact patient, phone is not accepting calls at this time.     HUB: If patient returns call please let her know that Jessica has not reviewed all the labs yet.

## 2024-10-17 LAB
A VAGINAE DNA VAG QL NAA+PROBE: NORMAL SCORE
BVAB2 DNA VAG QL NAA+PROBE: NORMAL SCORE
C ALBICANS DNA VAG QL NAA+PROBE: NEGATIVE
C GLABRATA DNA VAG QL NAA+PROBE: NEGATIVE
MEGA1 DNA VAG QL NAA+PROBE: NORMAL SCORE
UREA BREATH TEST QL: NEGATIVE

## 2024-10-17 NOTE — TELEPHONE ENCOUNTER
2nd Attempt to contact patient, phone is not accepting calls at this time.      HUB TO RELAY: If patient returns call please let her know that Jessica has not reviewed all the labs yet.

## 2024-10-24 DIAGNOSIS — R11.2 NAUSEA AND VOMITING, UNSPECIFIED VOMITING TYPE: Primary | ICD-10-CM

## 2024-10-30 DIAGNOSIS — E11.65 TYPE 2 DIABETES MELLITUS WITH HYPERGLYCEMIA, UNSPECIFIED WHETHER LONG TERM INSULIN USE: Primary | ICD-10-CM

## 2024-10-30 RX ORDER — SEMAGLUTIDE 1.34 MG/ML
1 INJECTION, SOLUTION SUBCUTANEOUS WEEKLY
Qty: 3 ML | Refills: 2 | Status: SHIPPED | OUTPATIENT
Start: 2024-10-30

## 2024-11-12 RX ORDER — HYDROXYZINE HYDROCHLORIDE 25 MG/1
TABLET, FILM COATED ORAL
Qty: 90 TABLET | Refills: 1 | Status: SHIPPED | OUTPATIENT
Start: 2024-11-12

## 2024-11-12 NOTE — TELEPHONE ENCOUNTER
Rx Refill Note  Requested Prescriptions     Pending Prescriptions Disp Refills    hydrOXYzine (ATARAX) 25 MG tablet [Pharmacy Med Name: HYDROXYZINE HCL 25 MG TABLET] 90 tablet 1     Sig: TAKE 1 TABLET BY MOUTH THREE TIMES A DAY AS NEEDED FOR ITCHING      Last office visit with prescribing clinician: 7/22/2024   Last telemedicine visit with prescribing clinician: Visit date not found   Next office visit with prescribing clinician: Visit date not found    No follow-up listed in last office note.     Leslie Rincon MA  11/12/24, 08:55 EST

## 2024-11-29 DIAGNOSIS — E11.65 TYPE 2 DIABETES MELLITUS WITH HYPERGLYCEMIA, UNSPECIFIED WHETHER LONG TERM INSULIN USE: ICD-10-CM

## 2024-11-29 DIAGNOSIS — E55.9 VITAMIN D INSUFFICIENCY: ICD-10-CM

## 2024-12-02 RX ORDER — SEMAGLUTIDE 1.34 MG/ML
1 INJECTION, SOLUTION SUBCUTANEOUS WEEKLY
Qty: 3 ML | Refills: 2 | OUTPATIENT
Start: 2024-12-02

## 2024-12-02 NOTE — TELEPHONE ENCOUNTER
Rx Refill Note  Requested Prescriptions     Pending Prescriptions Disp Refills    Cholecalciferol (Vitamin D) 50 MCG (2000 UT) tablet 90 tablet 1     Sig: Take 1 tablet by mouth Daily for 180 days.      Last office visit with prescribing clinician: 10/15/2024   Last telemedicine visit with prescribing clinician: Visit date not found   Next office visit with prescribing clinician: Visit date not found     Pati Dooley MA  12/02/24, 16:57 EST

## 2024-12-03 RX ORDER — CHOLECALCIFEROL (VITAMIN D3) 50 MCG
2000 TABLET ORAL DAILY
Qty: 90 TABLET | Refills: 1 | Status: SHIPPED | OUTPATIENT
Start: 2024-12-03 | End: 2025-06-01

## 2024-12-11 DIAGNOSIS — R79.89 ELEVATED LFTS: Primary | ICD-10-CM

## 2024-12-14 DIAGNOSIS — E11.65 TYPE 2 DIABETES MELLITUS WITH HYPERGLYCEMIA, WITHOUT LONG-TERM CURRENT USE OF INSULIN: ICD-10-CM

## 2024-12-16 RX ORDER — EMPAGLIFLOZIN 25 MG/1
25 TABLET, FILM COATED ORAL DAILY
Qty: 90 TABLET | Refills: 0 | Status: SHIPPED | OUTPATIENT
Start: 2024-12-16

## 2025-01-06 ENCOUNTER — TELEMEDICINE (OUTPATIENT)
Dept: INTERNAL MEDICINE | Facility: CLINIC | Age: 25
End: 2025-01-06
Payer: COMMERCIAL

## 2025-01-06 DIAGNOSIS — U07.1 COVID-19 VIRUS DETECTED: Primary | ICD-10-CM

## 2025-01-06 DIAGNOSIS — E66.811 CLASS 1 OBESITY DUE TO EXCESS CALORIES WITHOUT SERIOUS COMORBIDITY WITH BODY MASS INDEX (BMI) OF 34.0 TO 34.9 IN ADULT: ICD-10-CM

## 2025-01-06 DIAGNOSIS — E11.65 TYPE 2 DIABETES MELLITUS WITH HYPERGLYCEMIA, WITHOUT LONG-TERM CURRENT USE OF INSULIN: ICD-10-CM

## 2025-01-06 DIAGNOSIS — E55.9 VITAMIN D INSUFFICIENCY: ICD-10-CM

## 2025-01-06 DIAGNOSIS — U07.1 COVID-19 VIRUS INFECTION: Primary | ICD-10-CM

## 2025-01-06 DIAGNOSIS — E66.09 CLASS 1 OBESITY DUE TO EXCESS CALORIES WITHOUT SERIOUS COMORBIDITY WITH BODY MASS INDEX (BMI) OF 34.0 TO 34.9 IN ADULT: ICD-10-CM

## 2025-01-06 PROCEDURE — 99214 OFFICE O/P EST MOD 30 MIN: CPT | Performed by: STUDENT IN AN ORGANIZED HEALTH CARE EDUCATION/TRAINING PROGRAM

## 2025-01-06 RX ORDER — PREDNISONE 20 MG/1
40 TABLET ORAL DAILY
Qty: 10 TABLET | Refills: 0 | Status: SHIPPED | OUTPATIENT
Start: 2025-01-06 | End: 2025-01-11

## 2025-01-06 RX ORDER — GLIPIZIDE 5 MG/1
5 TABLET, FILM COATED, EXTENDED RELEASE ORAL DAILY
Qty: 90 TABLET | Refills: 1 | Status: SHIPPED | OUTPATIENT
Start: 2025-01-06

## 2025-01-06 RX ORDER — BROMPHENIRAMINE MALEATE, PSEUDOEPHEDRINE HYDROCHLORIDE, AND DEXTROMETHORPHAN HYDROBROMIDE 2; 30; 10 MG/5ML; MG/5ML; MG/5ML
5 SYRUP ORAL 4 TIMES DAILY PRN
Qty: 118 ML | Refills: 0 | Status: SHIPPED | OUTPATIENT
Start: 2025-01-06

## 2025-01-07 RX ORDER — ESCITALOPRAM OXALATE 10 MG/1
10 TABLET ORAL DAILY
Qty: 90 TABLET | Refills: 1 | Status: SHIPPED | OUTPATIENT
Start: 2025-01-07

## 2025-01-07 RX ORDER — CHOLECALCIFEROL (VITAMIN D3) 50 MCG
2000 TABLET ORAL DAILY
Qty: 90 TABLET | Refills: 1 | Status: SHIPPED | OUTPATIENT
Start: 2025-01-07 | End: 2025-07-06

## 2025-01-07 NOTE — PROGRESS NOTES
Telehealth Visit     Date: 2025   Patient Name: Catalina Antunez  : 2000   MRN: [id number]     Chief Complaint:    Chief Complaint   Patient presents with    Cough    Diabetes       This provider is located at the [facility] in [place], [place]. The patient is being seen remotely via telehealth at their home address in [place], and stated they are in a secure environment for this session. The patient's condition being diagnosed/treated is appropriate for telemedicine. The provider identified himself as well as his credentials. The patient, and/or patients guardian, consent to be seen remotely, and when consent is given they understand that the consent allows for patient identifiable information to be sent to a third party as needed. They may refuse to be seen remotely at any time. The electronic data is encrypted and password protected, and the patient and/or guardian has been advised of the potential risks to privacy not withstanding such measures.    You have chosen to receive care through a telehealth visit. Do you consent to use a video/audio connection for your medical care today? Yes    History of Present Illness: Catalina Antunez is a 24 y.o. female who is here today to follow up with         Subjective      complains of Covid 19, tested positive with home test on .. Symptoms include achiness, congestion, cough described as productive, and low grade fever. Onset of symptoms was 2 days ago, and has been gradually worsening since that time.     Type II DM  A1c 7.6  She is on ozempic and jardiance, she wants to stop jardance due to increase urinary frequency and UTI  She is on ozempic 1mg, tolerating the medication.       Review of Systems:   Review of Systems   All other systems reviewed and are negative.      I have reviewed and the following portions of the patient's history were updated as appropriate: past family history, past medical  history, past social history, past surgical history and problem list.    Medications:     Current Outpatient Medications:     brompheniramine-pseudoephedrine-DM 30-2-10 MG/5ML syrup, Take 5 mL by mouth 4 (Four) Times a Day As Needed for Allergies., Disp: 118 mL, Rfl: 0    Cholecalciferol (Vitamin D) 50 MCG (2000 UT) tablet, Take 1 tablet by mouth Daily for 180 days., Disp: 90 tablet, Rfl: 1    escitalopram (Lexapro) 10 MG tablet, Take 1 tablet by mouth Daily., Disp: 90 tablet, Rfl: 1    glipizide (Glucotrol XL) 5 MG ER tablet, Take 1 tablet by mouth Daily., Disp: 90 tablet, Rfl: 1    Glucose Blood (Blood Glucose Test) strip, 1 each by In Vitro route 4 (Four) Times a Day., Disp: 150 each, Rfl: 11    hydrOXYzine (ATARAX) 25 MG tablet, TAKE 1 TABLET BY MOUTH THREE TIMES A DAY AS NEEDED FOR ITCHING, Disp: 90 tablet, Rfl: 1    Jardiance 25 MG tablet tablet, TAKE 1 TABLET BY MOUTH EVERY DAY, Disp: 90 tablet, Rfl: 0    Lancets (onetouch ultrasoft) lancets, Check blood glucose 30 minutes before each meal and before bed., Disp: 120 each, Rfl: 11    loperamide (IMODIUM) 2 MG capsule, Take 1 capsule by mouth 4 (Four) Times a Day As Needed for Diarrhea., Disp: 60 capsule, Rfl: 1    VETO ROOT PO, Take  by mouth., Disp: , Rfl:     Mirena, 52 MG, 20 MCG/DAY intrauterine device IUD, To be inserted one time by prescriber. Route intrauterine., Disp: , Rfl:     ondansetron ODT (ZOFRAN-ODT) 4 MG disintegrating tablet, Take 1 tablet by mouth As Needed., Disp: , Rfl:     predniSONE (DELTASONE) 20 MG tablet, Take 2 tablets by mouth Daily for 5 days., Disp: 10 tablet, Rfl: 0    promethazine (PHENERGAN) 25 MG tablet, Take 1 tablet by mouth Every 6 (Six) Hours As Needed for Nausea or Vomiting., Disp: 60 tablet, Rfl: 1    Semaglutide, 1 MG/DOSE, (Ozempic, 1 MG/DOSE,) 4 MG/3ML solution pen-injector, Inject 1 mg under the skin into the appropriate area as directed 1 (One) Time Per Week., Disp: 3 mL, Rfl: 2    Allergies:   Allergies   Allergen  Reactions    Bee Venom Swelling    Mushroom Swelling       Objective     Physical Exam:  Vital Signs: There were no vitals filed for this visit.  There is no height or weight on file to calculate BMI.    Physical Exam  Constitutional:       Appearance: Normal appearance.   Neurological:      Mental Status: She is alert.         Assessment / Plan      Assessment/Plan:   1. COVID-19 virus infection  Discussed covid 19 virus infection, since virus no need for abx  I sent decongestant, cough medicine to pharmacy  Iburpofen/tylenol for fever/myalgias  Discussed ED precautions    2. Type 2 diabetes mellitus with hyperglycemia, without long-term current use of insulin  A1c 7.6  Stop jardiance, she is unable to tolerate s/e  Continue ozempic 1mg qweekly  Start Glucotrol 5mg daily  Follow up 3 months       3. Class 1 obesity due to excess calories without serious comorbidity with body mass index (BMI) of 34.0 to 34.9 in adult  Discussed risk associated with obesity. she was given instructions on calorie counting as well as on decreasing carbohydrate intake. she was also encouraged to start exercise regimen.  Instructed patient to download fitness casey on her smart phone to aid in calorie counting and exercise tracking.        Follow Up:   No follow-ups on file.    Any medications prescribed have been sent electronically to   [facility]/pharmacy #[id number] - [place], [place] - [address] - [phone]  - [phone] FX  [address] [address]  Phone: [phone] Fax: [phone]        Sung Dejesus MD

## 2025-01-13 ENCOUNTER — TELEMEDICINE (OUTPATIENT)
Dept: INTERNAL MEDICINE | Facility: CLINIC | Age: 25
End: 2025-01-13
Payer: COMMERCIAL

## 2025-01-13 DIAGNOSIS — R11.0 NAUSEA: ICD-10-CM

## 2025-01-13 DIAGNOSIS — R19.7 DIARRHEA, UNSPECIFIED TYPE: Primary | ICD-10-CM

## 2025-01-13 PROCEDURE — 1160F RVW MEDS BY RX/DR IN RCRD: CPT | Performed by: STUDENT IN AN ORGANIZED HEALTH CARE EDUCATION/TRAINING PROGRAM

## 2025-01-13 PROCEDURE — 1159F MED LIST DOCD IN RCRD: CPT | Performed by: STUDENT IN AN ORGANIZED HEALTH CARE EDUCATION/TRAINING PROGRAM

## 2025-01-13 PROCEDURE — 99213 OFFICE O/P EST LOW 20 MIN: CPT | Performed by: STUDENT IN AN ORGANIZED HEALTH CARE EDUCATION/TRAINING PROGRAM

## 2025-01-13 RX ORDER — METOCLOPRAMIDE 5 MG/1
5 TABLET ORAL 3 TIMES DAILY PRN
Qty: 90 TABLET | Refills: 0 | Status: SHIPPED | OUTPATIENT
Start: 2025-01-13

## 2025-01-30 DIAGNOSIS — E11.65 TYPE 2 DIABETES MELLITUS WITH HYPERGLYCEMIA, UNSPECIFIED WHETHER LONG TERM INSULIN USE: ICD-10-CM

## 2025-01-30 RX ORDER — SEMAGLUTIDE 1.34 MG/ML
1 INJECTION, SOLUTION SUBCUTANEOUS WEEKLY
Qty: 3 ML | Refills: 5 | Status: SHIPPED | OUTPATIENT
Start: 2025-01-30

## 2025-02-02 NOTE — PROGRESS NOTES
Telehealth Visit     Date: 2025   Patient Name: Catalina Antunez  : 2000   MRN: [id number]     Chief Complaint:    Chief Complaint   Patient presents with    Nausea    Diarrhea              This provider is located at the [facility] in [place], [place]. The patient is being seen remotely via telehealth at their home address in [place], and stated they are in a secure environment for this session. The patient's condition being diagnosed/treated is appropriate for telemedicine. The provider identified himself as well as his credentials. The patient, and/or patients guardian, consent to be seen remotely, and when consent is given they understand that the consent allows for patient identifiable information to be sent to a third party as needed. They may refuse to be seen remotely at any time. The electronic data is encrypted and password protected, and the patient and/or guardian has been advised of the potential risks to privacy not withstanding such measures.    You have chosen to receive care through a telehealth visit. Do you consent to use a video/audio connection for your medical care today? Yes    History of Present Illness: Catalina Antunez is a 24 y.o. female who is here today to follow up with         Subjective      Has been having chronic nausea,diarrhea and abdominal cramping. Denies any hematuria, no hematemesis, no melena, no hematochezia. Describes stools as loose, non-foul smelling. This has been on going for the past few months. She is able to tolerate liquids and solids. She is on ozempic, but states when she did take her medicine, she still had similar issues.      Review of Systems:   Review of Systems   All other systems reviewed and are negative.      I have reviewed and the following portions of the patient's history were updated as appropriate: past family history, past medical history, past social history, past surgical history and  problem list.    Medications:     Current Outpatient Medications:     brompheniramine-pseudoephedrine-DM 30-2-10 MG/5ML syrup, Take 5 mL by mouth 4 (Four) Times a Day As Needed for Allergies., Disp: 118 mL, Rfl: 0    Cholecalciferol (Vitamin D) 50 MCG (2000 UT) tablet, Take 1 tablet by mouth Daily for 180 days., Disp: 90 tablet, Rfl: 1    escitalopram (Lexapro) 10 MG tablet, Take 1 tablet by mouth Daily., Disp: 90 tablet, Rfl: 1    glipizide (Glucotrol XL) 5 MG ER tablet, Take 1 tablet by mouth Daily., Disp: 90 tablet, Rfl: 1    Glucose Blood (Blood Glucose Test) strip, 1 each by In Vitro route 4 (Four) Times a Day., Disp: 150 each, Rfl: 11    hydrOXYzine (ATARAX) 25 MG tablet, TAKE 1 TABLET BY MOUTH THREE TIMES A DAY AS NEEDED FOR ITCHING, Disp: 90 tablet, Rfl: 1    Jardiance 25 MG tablet tablet, TAKE 1 TABLET BY MOUTH EVERY DAY, Disp: 90 tablet, Rfl: 0    Lancets (onetouch ultrasoft) lancets, Check blood glucose 30 minutes before each meal and before bed., Disp: 120 each, Rfl: 11    loperamide (IMODIUM) 2 MG capsule, Take 1 capsule by mouth 4 (Four) Times a Day As Needed for Diarrhea., Disp: 60 capsule, Rfl: 1    VETO ROOT PO, Take  by mouth., Disp: , Rfl:     metoclopramide (Reglan) 5 MG tablet, Take 1 tablet by mouth 3 (Three) Times a Day As Needed (n/v)., Disp: 90 tablet, Rfl: 0    Mirena, 52 MG, 20 MCG/DAY intrauterine device IUD, To be inserted one time by prescriber. Route intrauterine., Disp: , Rfl:     ondansetron ODT (ZOFRAN-ODT) 4 MG disintegrating tablet, Take 1 tablet by mouth As Needed., Disp: , Rfl:     promethazine (PHENERGAN) 25 MG tablet, Take 1 tablet by mouth Every 6 (Six) Hours As Needed for Nausea or Vomiting., Disp: 60 tablet, Rfl: 1    Semaglutide, 1 MG/DOSE, (Ozempic, 1 MG/DOSE,) 4 MG/3ML solution pen-injector, Inject 1 mg under the skin into the appropriate area as directed 1 (One) Time Per Week., Disp: 3 mL, Rfl: 5    Allergies:   Allergies   Allergen Reactions    Bee Venom Swelling     Mushroom Swelling       Objective     Physical Exam:  Vital Signs: There were no vitals filed for this visit.  There is no height or weight on file to calculate BMI.    Physical Exam  Constitutional:       Appearance: Normal appearance.   Neurological:      Mental Status: She is alert.         Assessment / Plan      Assessment/Plan:   1. Diarrhea, unspecified type    - metoclopramide (Reglan) 5 MG tablet; Take 1 tablet by mouth 3 (Three) Times a Day As Needed (n/v).  Dispense: 90 tablet; Refill: 0  - H. Pylori Breath Test - Breath, Lung; Future  - Gastrointestinal Panel, PCR - Stool, Per Rectum  - Calprotectin, Fecal - Stool, Per Rectum; Future  - Lipase; Future  - Comprehensive metabolic panel; Future    2. Nausea    - metoclopramide (Reglan) 5 MG tablet; Take 1 tablet by mouth 3 (Three) Times a Day As Needed (n/v).  Dispense: 90 tablet; Refill: 0  - H. Pylori Breath Test - Breath, Lung; Future  - Gastrointestinal Panel, PCR - Stool, Per Rectum  - Calprotectin, Fecal - Stool, Per Rectum; Future  - Lipase; Future  - Comprehensive metabolic panel; Future      Follow Up:   No follow-ups on file.    Any medications prescribed have been sent electronically to   [facility]/pharmacy #[id number] - [place], [place] - [address] - [phone]  - [phone] FX  [address] [address]  Phone: [phone] Fax: [phone]          Sung Dejesus MD

## 2025-06-06 RX ORDER — POLYMYXIN B SULFATE AND TRIMETHOPRIM 1; 10000 MG/ML; [USP'U]/ML
1 SOLUTION OPHTHALMIC 2 TIMES DAILY
Qty: 10 ML | Refills: 0 | Status: SHIPPED | OUTPATIENT
Start: 2025-06-06 | End: 2025-06-11

## 2025-07-08 RX ORDER — GLIPIZIDE 5 MG/1
5 TABLET, FILM COATED, EXTENDED RELEASE ORAL DAILY
Qty: 90 TABLET | Refills: 1 | OUTPATIENT
Start: 2025-07-08

## 2025-07-08 NOTE — TELEPHONE ENCOUNTER
Patient has been scheduled to see Dr. Dejesus for her diabetes follow up.  Patient has enough glipizide until she is seen.

## 2025-07-10 RX ORDER — ESCITALOPRAM OXALATE 10 MG/1
10 TABLET ORAL DAILY
Qty: 30 TABLET | Refills: 0 | Status: SHIPPED | OUTPATIENT
Start: 2025-07-10

## 2025-07-29 ENCOUNTER — PRIOR AUTHORIZATION (OUTPATIENT)
Dept: INTERNAL MEDICINE | Facility: CLINIC | Age: 25
End: 2025-07-29
Payer: COMMERCIAL

## 2025-07-30 RX ORDER — GLIPIZIDE 5 MG/1
5 TABLET, FILM COATED, EXTENDED RELEASE ORAL DAILY
Qty: 30 TABLET | Refills: 0 | Status: SHIPPED | OUTPATIENT
Start: 2025-07-30

## 2025-08-05 ENCOUNTER — OFFICE VISIT (OUTPATIENT)
Dept: INTERNAL MEDICINE | Facility: CLINIC | Age: 25
End: 2025-08-05
Payer: COMMERCIAL

## 2025-08-05 VITALS
RESPIRATION RATE: 16 BRPM | HEART RATE: 72 BPM | DIASTOLIC BLOOD PRESSURE: 66 MMHG | WEIGHT: 205 LBS | BODY MASS INDEX: 32.95 KG/M2 | OXYGEN SATURATION: 98 % | TEMPERATURE: 97.3 F | HEIGHT: 66 IN | SYSTOLIC BLOOD PRESSURE: 108 MMHG

## 2025-08-05 DIAGNOSIS — E11.65 TYPE 2 DIABETES MELLITUS WITH HYPERGLYCEMIA, UNSPECIFIED WHETHER LONG TERM INSULIN USE: Primary | ICD-10-CM

## 2025-08-05 DIAGNOSIS — F51.01 PRIMARY INSOMNIA: ICD-10-CM

## 2025-08-05 DIAGNOSIS — R53.83 OTHER FATIGUE: ICD-10-CM

## 2025-08-05 LAB
EXPIRATION DATE: ABNORMAL
EXPIRATION DATE: ABNORMAL
HBA1C MFR BLD: 8.3 % (ref 4.5–5.7)
Lab: ABNORMAL
Lab: ABNORMAL
POC ALBUMIN, URINE: 80 MG/L
POC CREATININE, URINE: 200 MG/DL
POC URINE ALB/CREA RATIO: ABNORMAL

## 2025-08-05 RX ORDER — TRAZODONE HYDROCHLORIDE 50 MG/1
50 TABLET ORAL NIGHTLY
Qty: 90 TABLET | Refills: 1 | Status: SHIPPED | OUTPATIENT
Start: 2025-08-05

## 2025-08-05 RX ORDER — GLIPIZIDE 10 MG/1
10 TABLET, FILM COATED, EXTENDED RELEASE ORAL DAILY
Qty: 90 TABLET | Refills: 1 | Status: SHIPPED | OUTPATIENT
Start: 2025-08-05

## 2025-08-05 RX ORDER — HYDROCHLOROTHIAZIDE 12.5 MG/1
CAPSULE ORAL DAILY
Qty: 1 EACH | Refills: 12 | Status: SHIPPED | OUTPATIENT
Start: 2025-08-05

## 2025-08-06 ENCOUNTER — PRIOR AUTHORIZATION (OUTPATIENT)
Dept: INTERNAL MEDICINE | Facility: CLINIC | Age: 25
End: 2025-08-06
Payer: COMMERCIAL

## 2025-08-19 DIAGNOSIS — E11.65 TYPE 2 DIABETES MELLITUS WITH HYPERGLYCEMIA, UNSPECIFIED WHETHER LONG TERM INSULIN USE: ICD-10-CM

## 2025-08-26 RX ORDER — GLIPIZIDE 5 MG/1
5 TABLET, FILM COATED, EXTENDED RELEASE ORAL DAILY
Qty: 30 TABLET | Refills: 0 | OUTPATIENT
Start: 2025-08-26